# Patient Record
Sex: MALE | Race: WHITE
[De-identification: names, ages, dates, MRNs, and addresses within clinical notes are randomized per-mention and may not be internally consistent; named-entity substitution may affect disease eponyms.]

---

## 2017-03-02 ENCOUNTER — HOSPITAL ENCOUNTER (OUTPATIENT)
Dept: HOSPITAL 33 - ED | Age: 47
Setting detail: OBSERVATION
LOS: 1 days | Discharge: HOME | End: 2017-03-03
Attending: FAMILY MEDICINE | Admitting: FAMILY MEDICINE
Payer: SELF-PAY

## 2017-03-02 DIAGNOSIS — F10.129: Primary | ICD-10-CM

## 2017-03-02 DIAGNOSIS — G89.4: ICD-10-CM

## 2017-03-02 DIAGNOSIS — Z79.899: ICD-10-CM

## 2017-03-02 DIAGNOSIS — F45.42: ICD-10-CM

## 2017-03-02 LAB
ALBUMIN SERPL-MCNC: 4 G/DL (ref 3.4–5)
ALP SERPL-CCNC: 158 U/L (ref 46–116)
ALT SERPL-CCNC: 89 U/L (ref 12–78)
ANION GAP SERPL CALC-SCNC: 10.9 MEQ/L (ref 5–15)
APAP SPEC-MCNC: < 2 UG/ML (ref 10–30)
AST SERPL QL: 94 U/L (ref 15–37)
BASOPHILS NFR BLD AUTO: 0.5 % (ref 0–0.4)
BILIRUB BLD-MCNC: 0.1 MG/DL (ref 0.2–1)
BUN SERPL-MCNC: 8 MG/DL (ref 9–20)
CHLORIDE SERPL-SCNC: 103 MEQ/L (ref 98–107)
CO2 SERPL-SCNC: 24.9 MEQ/L (ref 21–32)
GLUCOSE SERPL-MCNC: 100 MG/DL (ref 70–110)
LIPASE SERPL-CCNC: 145 U/L (ref 73–393)
MCH RBC QN AUTO: 33.5 PG (ref 26–32)
NEUTROPHILS NFR BLD AUTO: 48.9 % (ref 36–66)
PLATELET # BLD AUTO: 137 K/MM3 (ref 150–450)
POTASSIUM SERPLBLD-SCNC: 3.9 MEQ/L (ref 3.5–5.1)
PROT SERPL-MCNC: 8 GM/DL (ref 6.4–8.2)
RBC # BLD AUTO: 5.19 M/MM3 (ref 4.1–5.6)
SODIUM SERPL-SCNC: 142 MEQ/L (ref 136–145)
TROPONIN T SERPL HS-MCNC: < 0.017 NG/ML (ref 0–0.06)
WBC # BLD AUTO: 7.6 K/MM3 (ref 4–10.5)

## 2017-03-02 PROCEDURE — 99285 EMERGENCY DEPT VISIT HI MDM: CPT

## 2017-03-02 PROCEDURE — 93005 ELECTROCARDIOGRAM TRACING: CPT

## 2017-03-02 PROCEDURE — 83690 ASSAY OF LIPASE: CPT

## 2017-03-02 PROCEDURE — 96374 THER/PROPH/DIAG INJ IV PUSH: CPT

## 2017-03-02 PROCEDURE — 80307 DRUG TEST PRSMV CHEM ANLYZR: CPT

## 2017-03-02 PROCEDURE — 80320 DRUG SCREEN QUANTALCOHOLS: CPT

## 2017-03-02 PROCEDURE — 36415 COLL VENOUS BLD VENIPUNCTURE: CPT

## 2017-03-02 PROCEDURE — 85025 COMPLETE CBC W/AUTO DIFF WBC: CPT

## 2017-03-02 PROCEDURE — 82150 ASSAY OF AMYLASE: CPT

## 2017-03-02 PROCEDURE — 96360 HYDRATION IV INFUSION INIT: CPT

## 2017-03-02 PROCEDURE — 36000 PLACE NEEDLE IN VEIN: CPT

## 2017-03-02 PROCEDURE — 83986 ASSAY PH BODY FLUID NOS: CPT

## 2017-03-02 PROCEDURE — 84484 ASSAY OF TROPONIN QUANT: CPT

## 2017-03-02 PROCEDURE — 80053 COMPREHEN METABOLIC PANEL: CPT

## 2017-03-02 NOTE — ERPHSYRPT
- History of Present Illness


Time Seen by Provider: 03/02/17 15:35


Source: patient


Exam Limitations: no limitations


Patient Subjective Stated Complaint: [facility] DEPARTMENT JANEE IN PT WHO BLEW 

A .399.  PT AGGRESSIVE AT TIMES WITH NURSING STAFF.


Triage Nursing Assessment: PT AGGRESSIVE AT TIMES WITH THE NURSING STAFF. 

[facility] DEP. PLACED CUFFS BACK ON PT. SPEECH IS RAMBLED. PT ALERT X 2. SKIN 

PINK WARM AND DRY. RESPIRATIONS EVEN AND UNLABORED.


Physician History: 





This is a 46-year-old white male with history of seizures, heart problems who 

states he drinks a pint a day daily for about a year brought by the [facility] for medical clearance





Patient was apparently found walking had a breath alcohol of 399.


Patient initially somewhat agitated and aggressive with the nurses patient 

currently is allowing me to complete his examination.


Patient states she's been having a cough he's had some chest pain with his 

cough.


No vomiting no diarrhea no abdominal pain.








Past medical history includes heart problems, seizures








.








Timing/Duration: other (patient brought by [facility] today states he drinks daily 

cough unknown period time associated with pain with coughing)


Modifying Factors: Improves With: nothing


Associated Symptoms: cough, chest pain (chest pain with cough), No nausea, No 

vomiting, No abdominal pain, No shortness of breath, No heartburn, No 

diaphoresis, No fever, No headaches, No loss of appetite, No malaise, No rash, 

No syncope, No seizure, No weakness


Allergies/Adverse Reactions: 








No Known Drug Allergies Allergy (Verified 10/08/16 17:47)


 





Home Medications: 








Hydrocodone Bit/Acetaminophen [Norco 7.5-325 Tablet] 1 each PO DAILY 03/02/17 [

History]





Hx Tetanus, Diphtheria Vaccination/Date Given: No


Hx Influenza Vaccination/Date Given: No


Hx Pneumococcal Vaccination/Date Given: No


Immunizations Up to Date: No





- Review of Systems


Constitutional: No Fever, No Chills


Eyes: No Symptoms


Ears, Nose, & Throat: No Symptoms


Respiratory: Cough, No Cyanosis, No Dyspnea, No Dyspnea on Exertion (CERVANTES), No 

Stridor, No Wheezing


Cardiac: Chest Pain (chest chest pain with cough), No Edema, No Palpitations, 

No Syncope, No Orthopnea


Abdominal/Gastrointestinal: No Abdominal Pain, No Nausea, No Vomiting, No 

Diarrhea


Genitourinary Symptoms: No Dysuria


Musculoskeletal: No Back Pain, No Neck Pain


Skin: No Rash


Neurological: No Dizziness, No Focal Weakness, No Gait Changes, No Headache, No 

Irritability, No Lethargy, No Paralysis, No Parasthesia, No Seizure, No Sensory 

Changes, No Speech Changes, No Tics, No Tremors, No Vertigo


Psychological: Alcohol Abuse (patient drinks a pint of  alcohol daily)


Endocrine: No Symptoms


All Other Systems: Reviewed and Negative





- Past Medical History


Pertinent Past Medical History: Yes


Neurological History: Seizures


ENT History: No Pertinent History


Cardiac History: Hypertension, Myocardial Infarction (MI)


Respiratory History: No Pertinent History


Endocrine Medical History: No Pertinent History


Musculoskeletal History: Arthritis


GI Medical History: No Pertinent History


 History: No Pertinent History


Psycho-Social History: No Pertinent History


Male Reproductive Disorders: No Pertinent History


Other Medical History: 2 LOWER DISCS ARE OUT,





- Past Surgical History


Past Surgical History: Yes


Neuro Surgical History: No Pertinent History


Cardiac: Cardiac Catheterization


Respiratory: No Pertinent History


Gastrointestinal: No Pertinent History


Genitourinary: No Pertinent History


Musculoskeletal: No Pertinent History


Male Surgical History: No Pertinent History





- Social History


Smoking Status: Current every day smoker


How long have you smoked: 20 YRS


Exposure to second hand smoke: No


Drug Use: none


Patient Lives Alone: No





- Nursing Vital Signs


Nursing Vital Signs: 


 Initial Vital Signs











Temperature                    97.6 F


 


Temperature Source             Axillary


 


Pulse Rate                     70


 


Respiratory Rate               16


 


Blood Pressure [Left Arm]      194/82


 


Pain Intensity                 0

















- Physical Exam


General Appearance: other (well-developed well-nourished white male , flushed 

in appearance, agitated at times)


Eye Exam: PERRL/EOMI, eyes nml inspection


Ears, Nose, Throat Exam: normal ENT inspection, TMs normal, pharynx normal, 

moist mucous membranes


Neck Exam: normal inspection, non-tender, supple, full range of motion


Respiratory Exam: rhonchi (bilateral rhonchi), No diminished breath sounds


Cardiovascular Exam: regular rate/rhythm, normal heart sounds, normal 

peripheral pulses


Gastrointestinal/Abdomen Exam: soft, normal bowel sounds, No tenderness, No mass


Back Exam: normal inspection, normal range of motion, No CVA tenderness, No 

vertebral tenderness


Extremity Exam: normal inspection, normal range of motion, pelvis stable


Neurologic Exam: alert, oriented x 3, normal mood/affect, nml cerebellar 

function, nml station & gait, sensation nml, other (patient agitated at times 

swearing at staff), No motor deficits


Skin Exam: other (flushed in appearance)


**SpO2 Interpretation**: normal





- Course


Nursing assessment & vital signs reviewed: Yes


EKG Interpreted by Me: RATE (93 bpm), Sinus Rhythm, Other (ekg, sinus [id number] 

bpm, axis SI/QIII pattern, no acute st ot t wave abnormalities noted)


Ordered Tests: 


 Active Orders 24 hr











 Category Date Time Status


 


 Bedrest with BRP/BSC ROUTINE Activity  03/02/17 18:48 Active


 


 Admission/Status Order ROUTINE Care  03/02/17 18:48 Active


 


 Call Admit Doctor for Orders ON ADMISSION Care  03/02/17 18:48 Active


 


 Code Status Order ROUTINE Care  03/02/17 18:48 Active


 


 EKG-ER Only STAT Care  03/02/17 15:33 Completed


 


 IV Care Q6H Care  03/02/17 18:48 Active


 


 IV Insertion STAT Care  03/02/17 15:33 Completed


 


 Telemetry ROUTINE Care  03/02/17 18:48 Active


 


 Clear Liquid Diet  03/02/17 Breakfast Active


 


 ACETAMINOPHEN Stat Lab  03/02/17 15:52 Completed


 


 ACETAMINOPHEN Urgent Lab  03/02/17 22:30 Ordered


 


 AMYLASE Stat Lab  03/02/17 15:52 Completed


 


 CBC W DIFF AM.LAB Lab  03/03/17 04:00 Ordered


 


 CBC W DIFF Stat Lab  03/02/17 15:52 Completed


 


 CMP AM.LAB Lab  03/03/17 04:00 Ordered


 


 CMP Stat Lab  03/02/17 15:52 Completed


 


 Ethyl Alcohol,Urine Stat Lab  03/02/17 16:15 Completed


 


 Ethyl Alcohol,Urine Urgent Lab  03/03/17 08:00 Ordered


 


 LIPASE Stat Lab  03/02/17 15:52 Completed


 


 SALICYLATE Stat Lab  03/02/17 15:52 Completed


 


 TROPONIN Stat Lab  03/02/17 15:52 Completed


 


 Urine Triage Profile Stat Lab  03/02/17 16:15 Completed


 


 Transfer Order Routine Transfer  03/02/17 18:19 Completed








Medication Summary











Generic Name Dose Route Start Last Admin





  Trade Name Freq  PRN Reason Stop Dose Admin


 


Sodium Chloride  1,000 mls @ 100 mls/hr  03/02/17 18:48  03/02/17 19:01





  Sodium Chloride 0.9% 1000 Ml  IV  04/01/17 18:47  100 mls/hr





  .Q10H LALI   Administration


 


Lorazepam  1 mg  03/02/17 18:48  





  Ativan 2 Mg/1 Ml Vial***  IV  04/01/17 18:47  





  PRN PRN   





  CIWA SCORE   


 


Nicotine  21 mg  03/02/17 19:15  





  Nicoderm Cq 21 Mg***  TOP  04/01/17 19:14  





  Q24H LALI   














Discontinued Medications














Generic Name Dose Route Start Last Admin





  Trade Name Freq  PRN Reason Stop Dose Admin


 


Sodium Chloride  1,000 mls @ 999 mls/hr  03/02/17 15:33  03/02/17 15:53





  Sodium Chloride 0.9% 1000 Ml  IV  03/02/17 16:33  999 mls/hr





  .Q1H1M STA   Administration


 


Sodium Chloride  Confirm  03/02/17 15:49  





  Sodium Chloride 0.9% 1000 Ml  Administered  03/02/17 15:50  





  Dose   





  1,000 mls @ ud   





  .ROUTE   





  .STK-MED ONE   


 


Thiamine HCl  100 mg  03/02/17 15:33  03/02/17 15:53





  Thiamine 200 Mg/2 Ml***  IV  03/02/17 15:34  100 mg





  STAT ONE   Administration


 


Thiamine HCl  Confirm  03/02/17 15:49  





  Thiamine 200 Mg/2 Ml***  Administered  03/02/17 15:50  





  Dose   





  200 mg   





  .ROUTE   





  .STK-MED ONE   











Lab/Rad Data: 


 Laboratory Result Diagrams





 03/02/17 15:52 





 03/02/17 15:52 





 Laboratory Results











  03/02/17 03/02/17 03/02/17 Range/Units





  16:15 16:15 15:52 


 


WBC     (4.0-10.5)  K/mm3


 


RBC     (4.1-5.6)  M/mm3


 


Hgb     (12.5-18.0)  gm/dl


 


Hct     (42-50)  %


 


MCV     ()  fl


 


MCH     (26-32)  pg


 


MCHC     (32-36)  g/dl


 


RDW     (11.5-14.0)  %


 


Plt Count     (150-450)  K/mm3


 


MPV     (6-9.5)  fl


 


Gran %     (36.0-66.0)  %


 


Lymphocytes %     (24.0-44.0)  %


 


Monocytes %     (0.0-12.0)  %


 


Eosinophils %     (0.00-5.0)  %


 


Basophils %     (0.0-0.4)  %


 


Basophils #     (0-0.4)  


 


Sodium    142  (136-145)  mEq/L


 


Potassium    3.9  (3.5-5.1)  mEq/L


 


Chloride    103  ()  mEq/L


 


Carbon Dioxide    24.9  (21-32)  mEq/L


 


Anion Gap    10.9  (5-15)  MEQ/L


 


BUN    8 L  (9-20)  mg/dL


 


Creatinine    0.66  (0.55-1.30)  mg/dl


 


Estimated GFR    > 60  ML/MIN


 


Glucose    100  ()  MG/DL


 


Calcium    9.3  (8.5-10.1)  mg/dL


 


Total Bilirubin    0.1 L  (0.2-1.0)  mg/dL


 


AST    94 H  (15-37)  U/L


 


ALT    89 H  (12-78)  U/L


 


Alkaline Phosphatase    158 H  ()  U/L


 


Troponin I    < 0.017  (0.000-0.056)  ng/ml


 


Serum Total Protein    8.0  (6.4-8.2)  gm/dL


 


Albumin    4.0  (3.4-5.0)  g/dL


 


Amylase    55  ()  U/L


 


Lipase    145  ()  U/L


 


Salicylates    5.3  (2.8-20.0)  mg/dl


 


Urine Opiates Level   NEG.   (NEGATIVE)  


 


Ur Methadone   NEG.   (NEGATIVE)  


 


Acetaminophen    < 2.0 L  (10-30)  ug/ml


 


Urine Barbiturates   NEG.   (NEGATIVE)  


 


Ur Phencyclidine (PCP)   NEG.   (NEGATIVE)  


 


Urine Amphetamine   NEG.   (NEGATIVE)  


 


U Benzodiazepine Level   NEG.   (NEGATIVE)  


 


Urine Cocaine   NEG.   (NEGATIVE)  


 


Urine Marijuana (THC)   NEG.   (NEGATIVE)  


 


Urine pH  6.5    (3-8.5)  


 


Urine Ethyl Alcohol  518 H    (0.00-20)  mg/dl














  03/02/17 Range/Units





  15:52 


 


WBC  7.6  (4.0-10.5)  K/mm3


 


RBC  5.19  (4.1-5.6)  M/mm3


 


Hgb  17.4  (12.5-18.0)  gm/dl


 


Hct  50.8 H  (42-50)  %


 


MCV  97.9  ()  fl


 


MCH  33.5 H  (26-32)  pg


 


MCHC  34.3  (32-36)  g/dl


 


RDW  14.7 H  (11.5-14.0)  %


 


Plt Count  137 L  (150-450)  K/mm3


 


MPV  9.3  (6-9.5)  fl


 


Gran %  48.9  (36.0-66.0)  %


 


Lymphocytes %  40.8  (24.0-44.0)  %


 


Monocytes %  8.6  (0.0-12.0)  %


 


Eosinophils %  1.2  (0.00-5.0)  %


 


Basophils %  0.5  (0.0-0.4)  %


 


Basophils #  0.04  (0-0.4)  


 


Sodium   (136-145)  mEq/L


 


Potassium   (3.5-5.1)  mEq/L


 


Chloride   ()  mEq/L


 


Carbon Dioxide   (21-32)  mEq/L


 


Anion Gap   (5-15)  MEQ/L


 


BUN   (9-20)  mg/dL


 


Creatinine   (0.55-1.30)  mg/dl


 


Estimated GFR   ML/MIN


 


Glucose   ()  MG/DL


 


Calcium   (8.5-10.1)  mg/dL


 


Total Bilirubin   (0.2-1.0)  mg/dL


 


AST   (15-37)  U/L


 


ALT   (12-78)  U/L


 


Alkaline Phosphatase   ()  U/L


 


Troponin I   (0.000-0.056)  ng/ml


 


Serum Total Protein   (6.4-8.2)  gm/dL


 


Albumin   (3.4-5.0)  g/dL


 


Amylase   ()  U/L


 


Lipase   ()  U/L


 


Salicylates   (2.8-20.0)  mg/dl


 


Urine Opiates Level   (NEGATIVE)  


 


Ur Methadone   (NEGATIVE)  


 


Acetaminophen   (10-30)  ug/ml


 


Urine Barbiturates   (NEGATIVE)  


 


Ur Phencyclidine (PCP)   (NEGATIVE)  


 


Urine Amphetamine   (NEGATIVE)  


 


U Benzodiazepine Level   (NEGATIVE)  


 


Urine Cocaine   (NEGATIVE)  


 


Urine Marijuana (THC)   (NEGATIVE)  


 


Urine pH   (3-8.5)  


 


Urine Ethyl Alcohol   (0.00-20)  mg/dl














- Progress


Progress: improved


Progress Note: 





03/02/17 18:16


Despite patient being alert and oriented 3 patient has a urine alcohol level 

of greater than 500 case is discussed with Dr. Laird Will plan to place 

patient on ICU telemetry will provide IV saline thiamine Ativan











- Departure


Time of Disposition: 18:17


Departure Disposition: Observation


Clinical Impression: 


 ALCOHOL TOXICITY





Alcohol intoxication


Qualifiers:


 Complication of substance-induced condition: with unspecified complication 

Qualified Code(s): F10.129 - Alcohol abuse with intoxication, unspecified





Condition: Fair


Critical Care Time: No

## 2017-03-03 VITALS — DIASTOLIC BLOOD PRESSURE: 109 MMHG | OXYGEN SATURATION: 99 % | SYSTOLIC BLOOD PRESSURE: 172 MMHG | HEART RATE: 90 BPM

## 2017-03-03 LAB
ALBUMIN SERPL-MCNC: 3.2 G/DL (ref 3.4–5)
ALP SERPL-CCNC: 129 U/L (ref 46–116)
ALT SERPL-CCNC: 69 U/L (ref 12–78)
ANION GAP SERPL CALC-SCNC: 15 MEQ/L (ref 5–15)
AST SERPL QL: 64 U/L (ref 15–37)
BILIRUB BLD-MCNC: 0.2 MG/DL (ref 0.2–1)
BUN SERPL-MCNC: 5 MG/DL (ref 9–20)
CELLS COUNTED: 100
CHLORIDE SERPL-SCNC: 106 MEQ/L (ref 98–107)
CO2 SERPL-SCNC: 25.2 MEQ/L (ref 21–32)
GLUCOSE SERPL-MCNC: 92 MG/DL (ref 70–110)
MCH RBC QN AUTO: 34.2 PG (ref 26–32)
NEUTS BAND # BLD MANUAL: 1 % (ref 0–2)
PLATELET # BLD AUTO: 93 K/MM3 (ref 150–450)
POTASSIUM SERPLBLD-SCNC: 4.1 MEQ/L (ref 3.5–5.1)
PROT SERPL-MCNC: 6.7 GM/DL (ref 6.4–8.2)
RBC # BLD AUTO: 4.33 M/MM3 (ref 4.1–5.6)
SODIUM SERPL-SCNC: 142 MEQ/L (ref 136–145)
TOXIC GRANULES BLD QL SMEAR: (no result)
VARIANT LYMPHS BLD QL SMEAR: 4 %
WBC # BLD AUTO: 3.7 K/MM3 (ref 4–10.5)

## 2017-03-03 NOTE — SSS
DISCHARGE DIAGNOSIS: INTOXICATION.



HISTORY OF PRESENT ILLNESS:  The patient is a 46 year-old white male patient who 
apparently is an alcoholic. He apparently drinks a pint of hard liquor every day. He was 
found by the police out wandering around with garbled speech and he was brought to the 
emergency room after having a 0.399 on the Breathalyzer test. The patient is felt the need 
to be admitted to the hospital for observation due to high level of intoxication that the 
patient has for his safety. He was placed in the ICU for monitoring overnight. 

 

MEDICATIONS: Essentially remarkable for only taking Norco on a basis for chronic pain. The 
patient is on no other medications. 

 

ALLERGIES: NKDA.  

 

PHYSICAL EXAMINATION: The patient is of small stature but otherwise well nourished and 
well developed. 

VITAL SIGNS: In the emergency room showed temperature 97.6F axillary, pulse 85, 
respiratory rate 18, blood pressure 139/89. O2 saturation reported as normal. 

HEENT:  Normocephalic, atraumatic. Pupils equal round reactive to light. Extraocular 
movements intact. Oropharynx is pink and moist.

NECK:  Supple without lymphadenopathy, thyromegaly or JVD. 

CHEST: Clear to auscultation with good air movement bilaterally. 

HEART: Regular rate and rhythm without murmurs, rubs or gallops.

ABDOMEN:  Soft, nontender, nondistended without hepatosplenomegaly or masses.

EXTREMITIES:  Without clubbing, cyanosis or edema. 

NEUROLOGIC: The patient is alert and oriented x3 this morning, cooperative and pleasant.  



LAB DATA AND TESTS:  Laboratory studies from the emergency room urine drug screen was 
negative. Metabolic panel showed glucose 100, BUN 8, creatinine 0.66. Liver enzymes were 
elevated at 94 SGOT, 89 SGPT 158 alkaline phosphatase. Amylase and lipase were normal. 
Troponin was less than 0.017.  Acetaminophen and salicylates were negative. He had a white 
blood cell count 11,100, hemoglobin 14.7, PLT count 194,000. He had urine performed for 
ETOH level was over 500 but I am unsure how this correlates with blood alcohol level. 
Apparently our machine will not currently do blood alcohol levels. 



HOSPITAL COURSE: By the next morning the patient's urine ETOH was 204 and was normal being 
0 to 20 but again the patient was awake, alert, and back to his usual state of health. We 
did have a discussion with him about alcoholism and about elevation in his liver enzymes. 
We suggested that he have a hepatitis panel drawn to rule out hepatitis C but he declined 
to have this done and reported he will have his primary care physician, Dr. Corby Hernandez, do this for him as an outpatient. The patient was felt to be ready for 
discharge home at this time. He reports that he will be calling his mother for a ride 
home.

## 2017-03-03 NOTE — PCM.DCORD
- Discharge


Discharge Date: 03/03/17


Condition: Fair


Prescriptions: 


Continue


   Hydrocodone Bit/Acetaminophen [Norco 7.5-325 Tablet] 1 each PO DAILY


Follow up with: 


EDILBERTO AVILEZ [Primary Care Provider] -

## 2017-06-15 ENCOUNTER — HOSPITAL ENCOUNTER (EMERGENCY)
Dept: HOSPITAL 33 - ED | Age: 47
Discharge: HOME | End: 2017-06-15
Payer: SELF-PAY

## 2017-06-15 VITALS — DIASTOLIC BLOOD PRESSURE: 118 MMHG | HEART RATE: 103 BPM | SYSTOLIC BLOOD PRESSURE: 167 MMHG | OXYGEN SATURATION: 98 %

## 2017-06-15 DIAGNOSIS — I10: ICD-10-CM

## 2017-06-15 DIAGNOSIS — S20.211A: Primary | ICD-10-CM

## 2017-06-15 DIAGNOSIS — I25.2: ICD-10-CM
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
## 2017-06-15 PROCEDURE — 71100 X-RAY EXAM RIBS UNI 2 VIEWS: CPT

## 2017-06-15 PROCEDURE — 99282 EMERGENCY DEPT VISIT SF MDM: CPT

## 2017-06-15 PROCEDURE — 71020: CPT

## 2017-06-15 PROCEDURE — 82962 GLUCOSE BLOOD TEST: CPT

## 2017-06-15 NOTE — XRAY
Indication: Right-sided pain following fall one week ago.



Comparison: None.



2 views of the right ribs demonstrates mild acromioclavicular degenerative

arthropathy.  No other bony, articular, or soft tissue abnormalities.

## 2017-06-15 NOTE — XRAY
Indication: Right-sided pain following fall one week ago.



Comparison: October 22, 2012.



PA/lateral chest again demonstrates normal heart, lungs, and bony thorax.

## 2017-06-15 NOTE — ERPHSYRPT
- History of Present Illness


Time Seen by Provider: 06/15/17 14:50


Source: patient


Exam Limitations: clinical condition


Patient Subjective Stated Complaint: pt here to see if he is diabetic because 

hes family is and co right rib pain after a fall a week ago, no other injury


Triage Nursing Assessment: pt has no bruising to right rib area, no sob, resp 

easy, skin w/d


Physician History: 





PATIENT WITH HISTORY OF HYERTENSION, SEIZURE DISORDER, CORONARY ARTERY DISEASE, 

FELL A WEEK AGO SUSTAINED INJURY TO HIS RIGHT SIDE OF HIS CHEST. HAS PAIN UPON 

INSPIRATION AND MOTION OF RIGHT ARM. DENIES ASSOCIATED HEAD NECK OR BACK INJURY.





Occurred: last week


Reason for Fall: lost balance (FELL AGAINST CAR DOOR)


Injuries/Pain Location: chest


Loss of Consciousness: no loss of consciousness


Quality: sharpness


Severity of Pain-Max: moderate


Severity of Pain-Current: moderate


Modifying Factors: Improves With: movement, other (TAKES NORCO DAILY)


Associated Symptoms (Fall): chest pain (RIGHT SIDED RIB PAIN)


Allergies/Adverse Reactions: 








No Known Drug Allergies Allergy (Verified 06/15/17 14:47)


 





Home Medications: 








Hydrocodone Bit/Acetaminophen [Norco 7.5-325 Tablet] 1 each PO DAILY 03/02/17 [

History]





Hx Tetanus, Diphtheria Vaccination/Date Given: No


Hx Influenza Vaccination/Date Given: No


Hx Pneumococcal Vaccination/Date Given: No


Immunizations Up to Date: Yes





- Review of Systems


Constitutional: No Symptoms


Cardiac: Chest Pain


Neurological: No Symptoms


Psychological: No Symptoms





- Past Medical History


Pertinent Past Medical History: Yes


Neurological History: Seizures


ENT History: No Pertinent History


Cardiac History: Hypertension, Myocardial Infarction (MI)


Respiratory History: No Pertinent History


Endocrine Medical History: No Pertinent History


Musculoskeletal History: Arthritis


GI Medical History: No Pertinent History


 History: No Pertinent History


Psycho-Social History: No Pertinent History


Male Reproductive Disorders: No Pertinent History


Other Medical History: 2 LOWER DISCS ARE OUT,





- Past Surgical History


Past Surgical History: Yes


Neuro Surgical History: No Pertinent History


Cardiac: Cardiac Catheterization


Respiratory: No Pertinent History


Gastrointestinal: No Pertinent History


Genitourinary: No Pertinent History


Musculoskeletal: No Pertinent History


Male Surgical History: No Pertinent History





- Social History


Smoking Status: Current every day smoker


How long have you smoked: 20 YRS


Exposure to second hand smoke: Yes


Drug Use: none


Patient Lives Alone: No





- Nursing Vital Signs


Nursing Vital Signs: 


 Initial Vital Signs











Temperature                    97.4 F


 


Temperature Source             Oral


 


Pulse Rate                     103


 


Respiratory Rate               16


 


Blood Pressure []              167/118


 


Pain Intensity                 7

















- Santino Coma Score


Best Eye Response (Santino): (4) open spontaneously


Best Verbal Response (Santino): (5) oriented


Best Motor Response (Marshall): (6) obeys commands


Santino Total: 15





- Physical Exam


General Appearance: no apparent distress, alert


Head Injury: no evidence of injury


Eye Exam: PERRL/EOMI


ENT Exam: airway nml


Neck Exam: normal inspection, No tenderness


Respiratory/Chest Exam: chest tenderness (RIGHT LATERAL CHEST WALL TENDERNESS 

5TH TO 7TH RIBS, INFERIOR TO AXILLA, NO CREPITUS OR ECCHYMOSIS), normal breath 

sounds, No respiratory distress


Cardiovascular Exam: normal heart sounds, regular rate/rhythm


Gastrointestinal Exam: soft, No tenderness, No distention, No guarding, No 

ecchymosis


Back Exam: normal inspection, No vertebral tenderness


Extremity Exam: normal inspection, normal range of motion, pelvis stable, No 

deformities


Peripheral Pulses: carotid (R): 2+, carotid (L): 2+, femoral (R): 2+, femoral (L

): 2+, dorsalis-pedis (R): 2+, dorsalis-pedis (L): 2+


Neurologic Exam: alert, oriented x 3, cooperative, sensation nml, No motor 

deficits


Skin Exam: normal color, warm, dry


**SpO2 Interpretation**: normal


SpO2: 98


Oxygen Delivery: Room Air





- Radiology Exams


  ** Chest


X-ray Interpretation: Discussed w/ radiologist, Negative





  ** Right Ribs


X-ray Interpretation: Discussed w/ radiologist (MILD ACROMIOCLAVICLAR  

DEGNERATIVE ARTHROPATHRY, NO FRACTURE)


Ordered Tests: 


 Active Orders 24 hr











 Category Date Time Status


 


 ACCUCHECK [Accucheck] STAT Care  06/15/17 15:47 Active


 


 CHEST 2 VIEWS (PA AND LAT) Stat Exams  06/15/17 15:06 Completed


 


 RIBS UNILATERAL Stat Exams  06/15/17 15:06 Completed














- Progress


Progress Note: 





06/15/17 15:50


ACCUCHECK 193


Counseled pt/family regarding: diagnosis, need for follow-up, rad results





- Departure


Time of Disposition: 15:50


Departure Disposition: Home


Clinical Impression: 


 RIGHT CHEST WALL CONTUSION





Condition: Stable


Critical Care Time: No


Referrals: 


EDILBERTO AVILEZ [Primary Care Provider] - 


Additional Instructions: 


CONTINUE ALL CURRENT MEDICATIONS FOR PAIN. FOLLOWUP WITH YOUR FAMILY PHYSICIAN 

TOMORROW FOR DIABETIC WORKUP.

## 2017-10-24 ENCOUNTER — HOSPITAL ENCOUNTER (EMERGENCY)
Dept: HOSPITAL 33 - ED | Age: 47
Discharge: LEFT BEFORE DISCHARGE | End: 2017-10-24
Payer: COMMERCIAL

## 2017-10-24 VITALS — OXYGEN SATURATION: 97 %

## 2017-10-24 VITALS — HEART RATE: 112 BPM | SYSTOLIC BLOOD PRESSURE: 146 MMHG | DIASTOLIC BLOOD PRESSURE: 106 MMHG

## 2017-10-24 DIAGNOSIS — Z91.14: ICD-10-CM

## 2017-10-24 DIAGNOSIS — F10.920: Primary | ICD-10-CM

## 2017-10-24 DIAGNOSIS — D69.6: ICD-10-CM

## 2017-10-24 DIAGNOSIS — R56.9: ICD-10-CM

## 2017-10-24 LAB
ALBUMIN SERPL-MCNC: 3.8 G/DL (ref 3.4–5)
ALP SERPL-CCNC: 198 U/L (ref 46–116)
ALT SERPL-CCNC: 221 U/L (ref 12–78)
ANION GAP SERPL CALC-SCNC: 18.5 MEQ/L (ref 5–15)
AST SERPL QL: 267 U/L (ref 15–37)
BACTERIA UR CULT: NO
BASE EXCESS BLDV CALC-SCNC: -3.2 MMOL/L (ref -2–2)
BASOPHILS NFR BLD AUTO: 0.1 % (ref 0–0.4)
BILIRUB BLD-MCNC: 1.2 MG/DL (ref 0.2–1)
BUN SERPL-MCNC: 5 MG/DL (ref 9–20)
CHLORIDE SERPL-SCNC: 100 MEQ/L (ref 98–107)
CO2 SERPL-SCNC: 21.8 MEQ/L (ref 21–32)
COHGB MFR BLDV: 6.1 % T HGB (ref 0–6.9)
COLLECTION TYPE: (no result)
COMPLETE URINE MICROSCOPIC?: YES
ETHANOL SERPL-MCNC: 0.29 % (ref 0–0.01)
GLUCOSE SERPL-MCNC: 109 MG/DL (ref 70–110)
GLUCOSE UR-MCNC: NEGATIVE MG/DL
HCO3 BLDV-SCNC: 21.3 MEQ/L (ref 22–28)
HGB BLDV-MCNC: 15.7 G/DL
INHALED O2 CONCENTRATION: 21 %
MAGNESIUM SERPL-MCNC: 1.8 MG/DL (ref 1.8–2.4)
MCH RBC QN AUTO: 34 PG (ref 26–32)
NEUTROPHILS NFR BLD AUTO: 75.4 % (ref 36–66)
PLATELET # BLD AUTO: 17 K/MM3 (ref 150–450)
POTASSIUM BLDV-SCNC: 3.3 MMOL/L (ref 3.5–5.1)
POTASSIUM SERPLBLD-SCNC: 3.1 MEQ/L (ref 3.5–5.1)
PROT SERPL-MCNC: 7.5 GM/DL (ref 6.4–8.2)
RBC # BLD AUTO: 4.29 M/MM3 (ref 4.1–5.6)
SAO2 % BLDV: 90.4 % (ref 95–100)
SODIUM SERPL-SCNC: 137 MEQ/L (ref 136–145)
WBC # BLD AUTO: 7 K/MM3 (ref 4–10.5)
WBC URNS QL MICRO: (no result) /HPF (ref 0–5)

## 2017-10-24 PROCEDURE — 70450 CT HEAD/BRAIN W/O DYE: CPT

## 2017-10-24 PROCEDURE — 81000 URINALYSIS NONAUTO W/SCOPE: CPT

## 2017-10-24 PROCEDURE — 71020: CPT

## 2017-10-24 PROCEDURE — 83735 ASSAY OF MAGNESIUM: CPT

## 2017-10-24 PROCEDURE — 36000 PLACE NEEDLE IN VEIN: CPT

## 2017-10-24 PROCEDURE — 99284 EMERGENCY DEPT VISIT MOD MDM: CPT

## 2017-10-24 PROCEDURE — 96360 HYDRATION IV INFUSION INIT: CPT

## 2017-10-24 PROCEDURE — 93041 RHYTHM ECG TRACING: CPT

## 2017-10-24 PROCEDURE — 83605 ASSAY OF LACTIC ACID: CPT

## 2017-10-24 PROCEDURE — 96372 THER/PROPH/DIAG INJ SC/IM: CPT

## 2017-10-24 PROCEDURE — 36415 COLL VENOUS BLD VENIPUNCTURE: CPT

## 2017-10-24 PROCEDURE — 80307 DRUG TEST PRSMV CHEM ANLYZR: CPT

## 2017-10-24 PROCEDURE — 80053 COMPREHEN METABOLIC PANEL: CPT

## 2017-10-24 PROCEDURE — 93005 ELECTROCARDIOGRAM TRACING: CPT

## 2017-10-24 PROCEDURE — 82805 BLOOD GASES W/O2 SATURATION: CPT

## 2017-10-24 PROCEDURE — 85025 COMPLETE CBC W/AUTO DIFF WBC: CPT

## 2017-10-24 PROCEDURE — 80185 ASSAY OF PHENYTOIN TOTAL: CPT

## 2017-10-24 NOTE — ERPHSYRPT
- History of Present Illness


Source: patient, other (friend)


Patient Subjective Stated Complaint: pt states he has been having seizures for 

the past few days. opt states in the past he was a heavy alcoholic. states he 

has not drank in 2 weeks before today. pt also c/o generalized weakness.


Triage Nursing Assessment: pt pink,warm dry. pt alert and oriented x3. pt 

smells of ETOH. pt afebrile.


Severity: moderate


Hx Tetanus, Diphtheria Vaccination/Date Given: Yes (unknown)


Hx Influenza Vaccination/Date Given: No


Hx Pneumococcal Vaccination/Date Given: No


Immunizations Up to Date: Yes





<TRACY VANESSA - Last Filed: 10/24/17 18:49>





<MADI TY - Last Filed: 10/24/17 21:14>





- History of Present Illness


Time Seen by Provider: 10/24/17 18:22


Physician History: 





CC: seizures


Hx: 48 y/o patient of Dr Hernandez. He has hx of seizures and takes dilantin 

but has not been taking a normal dose. He is a chronic drinker of alcohol. 

Friend reports he has had some seizures. Bit tongue. Bruise on back. A little 

confused. Denies other injury. No neck or back pain. No hx of DM. Has cut back 

on alcohol. May be out of norco.  (TRACY VANESSA)


Allergies/Adverse Reactions: 








No Known Drug Allergies Allergy (Verified 10/24/17 18:39)


 





Home Medications: 








Phenytoin Sod Extended 100 mg* [Dilantin 100 MG***] 300 mg PO DAILY 10/24/17 [

History]








- Review of Systems


Constitutional: No Fever, No Chills


Eyes: No Vision Changes


Ears, Nose, & Throat: No Symptoms


Respiratory: No Cough, No Dyspnea


Cardiac: No Chest Pain, No Syncope


Abdominal/Gastrointestinal: No Abdominal Pain, No Nausea, No Vomiting, No 

Diarrhea


Genitourinary Symptoms: No Hematuria


Musculoskeletal: No Back Pain, No Neck Pain


Skin: No Rash


Neurological: Seizure, No Focal Weakness, No Headache, No Parasthesia


All Other Systems: Reviewed and Negative





<TRACY VANESSA - Last Filed: 10/24/17 18:49>





- Past Medical History


Pertinent Past Medical History: Yes


Neurological History: Seizures


ENT History: No Pertinent History


Cardiac History: Hypertension, Myocardial Infarction (MI)


Respiratory History: No Pertinent History


Endocrine Medical History: No Pertinent History


Musculoskeletal History: Arthritis


GI Medical History: No Pertinent History


 History: No Pertinent History


Psycho-Social History: No Pertinent History


Male Reproductive Disorders: No Pertinent History


Other Medical History: 2 LOWER DISCS ARE OUT,





- Past Surgical History


Past Surgical History: Yes


Neuro Surgical History: No Pertinent History


Cardiac: Cardiac Catheterization


Respiratory: No Pertinent History


Gastrointestinal: No Pertinent History


Genitourinary: No Pertinent History


Musculoskeletal: No Pertinent History


Male Surgical History: No Pertinent History





- Social History


Smoking Status: Current every day smoker


How long have you smoked: 11


Exposure to second hand smoke: No


Drug Use: none


Patient Lives Alone: No





<TRACY VANESSA - Last Filed: 10/24/17 18:49>





- Physical Exam


General Appearance: alert, other (smells of liquor, answers questions 

tangentially, follows commands)


Eye Exam: PERRL/EOMI


Ears, Nose, Throat Exam: normal ENT inspection, moist mucous membranes


Neck Exam: normal inspection, non-tender, supple


Respiratory Exam: normal breath sounds, other (eccymosis right flank)


Cardiovascular Exam: regular rate/rhythm, No murmur


Gastrointestinal/Abdomen Exam: soft, No tenderness, No distention, No mass, No 

guarding


Male Genitalia Exam: normal genitalia


Back Exam: No vertebral tenderness


Extremity Exam: normal inspection, normal range of motion


Neurologic Exam: alert, cooperative, CNs II-XII nml as tested, sensation nml, 

No motor deficits


Skin Exam: warm, dry, No rash


**SpO2 Interpretation**: normal


SpO2: 98


Oxygen Delivery: Room Air





<TRACY VANESSA - Last Filed: 10/24/17 18:49>





- Nursing Vital Signs


Nursing Vital Signs: 


 Initial Vital Signs











Temperature  98.0 F   10/24/17 18:31


 


Pulse Rate  112 H  10/24/17 18:31


 


Respiratory Rate  20   10/24/17 18:31


 


Blood Pressure  146/106   10/24/17 18:31


 


O2 Sat by Pulse Oximetry  98   10/24/17 18:31








 Pain Scale











Pain Intensity                 3

















- Course


Nursing assessment & vital signs reviewed: Yes





<TRACY VANESSA - Last Filed: 10/24/17 18:49>





- Course


EKG Interpreted by Me: Sinus Tach (), NORMAL AXIS, NORMAL INTERVALS, Non-

specific ST Changes





<TY,MADI - Last Filed: 10/24/17 21:14>


Ordered Tests: 


 Active Orders 24 hr











 Category Date Time Status


 


 Cardiac Monitor STAT Care  10/24/17 18:42 Active


 


 EKG-ER Only STAT Care  10/24/17 18:41 Active


 


 IV Insertion STAT Care  10/24/17 18:43 Active


 


 Pulse Oximetry (ED) STAT Care  10/24/17 18:41 Active


 


 CHEST 2 VIEWS (PA AND LAT) Stat Exams  10/24/17 18:49 Taken


 


 HEAD WITHOUT CONTRAST [CT] Stat Exams  10/24/17 18:43 Taken


 


 CBC W DIFF Stat Lab  10/24/17 19:16 Results


 


 CMP Stat Lab  10/24/17 19:00 Completed


 


 DILANTIN(PHENYTOIN) Stat Lab  10/24/17 19:00 Completed


 


 ETHYL ALCOHOL Stat Lab  10/24/17 19:00 Completed


 


 Lactic Acid Stat Lab  10/24/17 19:13 Results


 


 MAGNESIUM Stat Lab  10/24/17 19:00 Completed


 


 Pathologist Review Stat Lab  10/24/17 19:16 Results


 


 UA W/ MICROSCOPIC Stat Lab  10/24/17 19:40 Completed


 


 Urine Triage Profile Stat Lab  10/24/17 19:40 Completed


 


 VENOUS BLOOD GAS Stat Lab  10/24/17 19:13 Completed








Medication Summary











Generic Name Dose Route Start Last Admin





  Trade Name Freq  PRN Reason Stop Dose Admin


 


Dextrose/Lactated Ringer's  1,000 mls @ 200 mls/hr  10/24/17 19:00  10/24/17 19:

16





  Dextrose 5%-Lr Iv Solution 1000 Ml  IV  11/23/17 18:59  200 mls/hr





  .Q5H LALI   Administration














Discontinued Medications














Generic Name Dose Route Start Last Admin





  Trade Name Freq  PRN Reason Stop Dose Admin


 


Phenytoin Sodium 1,000 mg/  120 mls @ 240 mls/hr  10/24/17 19:45  





  Sodium Chloride  IV  10/24/17 20:14  





  STAT ONE   


 


Sodium Chloride  1,000 mls @ 999 mls/hr  10/24/17 19:46  10/24/17 20:04





  Sodium Chloride 0.9% 1000 Ml  IV  10/24/17 20:46  999 mls/hr





  .Q1H1M STA   Administration


 


Sodium Chloride  Confirm  10/24/17 20:01  





  Sodium Chloride 0.9% 1000 Ml  Administered  10/24/17 20:02  





  Dose   





  1,000 mls @ ud   





  .ROUTE   





  .STK-MED ONE   


 


Potassium Chloride  40 meq  10/24/17 19:55  10/24/17 20:23





  Klor Con 10 Meq***  PO  10/24/17 19:56  40 meq





  STAT ONE   Administration


 


Potassium Chloride  Confirm  10/24/17 20:06  





  Klor Con 10 Meq***  Administered  10/24/17 20:07  





  Dose   





  40 meq   





  PO   





  .STK-MED ONE   


 


Thiamine HCl  100 mg  10/24/17 18:43  10/24/17 19:17





  Thiamine 200 Mg/2 Ml***  IM  10/24/17 18:44  100 mg





  STAT ONE   Administration


 


Thiamine HCl  Confirm  10/24/17 19:11  





  Thiamine 200 Mg/2 Ml***  Administered  10/24/17 19:12  





  Dose   





  200 mg   





  .ROUTE   





  .STK-MED ONE   











Lab/Rad Data: 


 Laboratory Result Diagrams





 10/24/17 19:16 





 10/24/17 19:00 





 Laboratory Results











  10/24/17 10/24/17 10/24/17 Range/Units





  19:40 19:40 19:16 


 


WBC    7.0  (4.0-10.5)  K/mm3


 


RBC    4.29  (4.1-5.6)  M/mm3


 


Hgb    14.6  (12.5-18.0)  gm/dl


 


Hct    42.8  (42-50)  %


 


MCV    99.8  ()  fl


 


MCH    34.0 H  (26-32)  pg


 


MCHC    34.1  (32-36)  g/dl


 


RDW    15.2 H  (11.5-14.0)  %


 


Plt Count    17 L*  (150-450)  K/mm3


 


MPV    12.7 H  (6-9.5)  fl


 


Gran %    75.4 H  (36.0-66.0)  %


 


Lymphocytes %    14.5 L  (24.0-44.0)  %


 


Monocytes %    9.7  (0.0-12.0)  %


 


Eosinophils %    0.3  (0.00-5.0)  %


 


Basophils %    0.1  (0.0-0.4)  %


 


Basophils #    0.01  (0-0.4)  


 


Smear Path Review    Pending  


 


VBG pH     (7.32-7.42)  


 


VBG pCO2 at Pat Temp     (42-55)  mm/Hg


 


VBG pO2 at Pat Temp     (25-40)  mm/Hg


 


VBG HCO3     (22-28)  meq/L


 


VBG O2 Sat (Irvin)     ()  


 


VBG Base Excess     (-2.0-2.0)  


 


VBG Hemoglobin     


 


VBG Carboxyhemoglobin     (0.0-6.9)  % T HGB


 


POC Potassium     (3.5-5.1)  


 


Sodium     (136-145)  mEq/L


 


Potassium     (3.5-5.1)  mEq/L


 


Chloride     ()  mEq/L


 


Carbon Dioxide     (21-32)  mEq/L


 


Anion Gap     (5-15)  MEQ/L


 


BUN     (9-20)  mg/dL


 


Creatinine     (0.55-1.30)  mg/dl


 


Estimated GFR     ML/MIN


 


Glucose     ()  MG/DL


 


Lactic Acid     (0.4-2.0)  


 


Calcium     (8.5-10.1)  mg/dL


 


Magnesium     (1.8-2.4)  mg/dL


 


Total Bilirubin     (0.2-1.0)  mg/dL


 


AST     (15-37)  U/L


 


ALT     (12-78)  U/L


 


Alkaline Phosphatase     ()  U/L


 


Serum Total Protein     (6.4-8.2)  gm/dL


 


Albumin     (3.4-5.0)  g/dL


 


Ur Collection Type   VOID   


 


Urine Color   YELLOW   (YELLOW)  


 


Urine Appearance   CLEAR   (CLEAR)  


 


Urine pH   7.0   (5-6)  


 


Ur Specific Gravity   1.005   (1.005-1.025)  


 


Urine Protein   NEGATIVE   (Negative)  


 


Urine Ketones   NEGATIVE   (NEGATIVE)  


 


Urine Blood   250   (0-5)  Mike/ul


 


Urine Nitrite   NEGATIVE   (NEGATIVE)  


 


Urine Bilirubin   NEGATIVE   (NEGATIVE)  


 


Urine Urobilinogen   1   (0-1)  mg/dL


 


Ur Leukocyte Esterase   NEGATIVE   (NEGATIVE)  


 


Urine Microscopic RBC   2-5   (0-2)  /HPF


 


Urine Microscopic WBC   0-2   (0-5)  /HPF


 


Ur Epithelial Cells   FEW   (FEW)  /HPF


 


Urine Bacteria   RARE   (NEGATIVE)  /HPF


 


Urine Culture Reflexed   NO   (NO)  


 


Urine Glucose   NEGATIVE   (NEGATIVE)  mg/dL


 


Urine Opiates Level  NEG.    (NEGATIVE)  


 


Ur Methadone  NEG.    (NEGATIVE)  


 


Urine Barbiturates  NEG.    (NEGATIVE)  


 


Phenytoin     (10-20)  ug/ml


 


Ur Phencyclidine (PCP)  NEG.    (NEGATIVE)  


 


Urine Amphetamine  NEG.    (NEGATIVE)  


 


U Benzodiazepine Level  NEG.    (NEGATIVE)  


 


Urine Cocaine  NEG.    (NEGATIVE)  


 


Urine Marijuana (THC)  NEG.    (NEGATIVE)  


 


Ethyl Alcohol     (0.00-0.01)  %


 


Specimen Received   10/24/17 2000   














  10/24/17 10/24/17 10/24/17 Range/Units





  19:13 19:13 19:00 


 


WBC     (4.0-10.5)  K/mm3


 


RBC     (4.1-5.6)  M/mm3


 


Hgb     (12.5-18.0)  gm/dl


 


Hct     (42-50)  %


 


MCV     ()  fl


 


MCH     (26-32)  pg


 


MCHC     (32-36)  g/dl


 


RDW     (11.5-14.0)  %


 


Plt Count     (150-450)  K/mm3


 


MPV     (6-9.5)  fl


 


Gran %     (36.0-66.0)  %


 


Lymphocytes %     (24.0-44.0)  %


 


Monocytes %     (0.0-12.0)  %


 


Eosinophils %     (0.00-5.0)  %


 


Basophils %     (0.0-0.4)  %


 


Basophils #     (0-0.4)  


 


Smear Path Review     


 


VBG pH   7.38   (7.32-7.42)  


 


VBG pCO2 at Pat Temp   36 L   (42-55)  mm/Hg


 


VBG pO2 at Pat Temp   53 H   (25-40)  mm/Hg


 


VBG HCO3   21.3 L   (22-28)  meq/L


 


VBG O2 Sat (Irvin)   90.4 L   ()  


 


VBG Base Excess   -3.2 L   (-2.0-2.0)  


 


VBG Hemoglobin   15.7   


 


VBG Carboxyhemoglobin   6.1   (0.0-6.9)  % T HGB


 


POC Potassium   3.3 L   (3.5-5.1)  


 


Sodium     (136-145)  mEq/L


 


Potassium     (3.5-5.1)  mEq/L


 


Chloride     ()  mEq/L


 


Carbon Dioxide     (21-32)  mEq/L


 


Anion Gap     (5-15)  MEQ/L


 


BUN     (9-20)  mg/dL


 


Creatinine     (0.55-1.30)  mg/dl


 


Estimated GFR     ML/MIN


 


Glucose     ()  MG/DL


 


Lactic Acid  3.8 H    (0.4-2.0)  


 


Calcium     (8.5-10.1)  mg/dL


 


Magnesium    1.8  (1.8-2.4)  mg/dL


 


Total Bilirubin     (0.2-1.0)  mg/dL


 


AST     (15-37)  U/L


 


ALT     (12-78)  U/L


 


Alkaline Phosphatase     ()  U/L


 


Serum Total Protein     (6.4-8.2)  gm/dL


 


Albumin     (3.4-5.0)  g/dL


 


Ur Collection Type     


 


Urine Color     (YELLOW)  


 


Urine Appearance     (CLEAR)  


 


Urine pH     (5-6)  


 


Ur Specific Gravity     (1.005-1.025)  


 


Urine Protein     (Negative)  


 


Urine Ketones     (NEGATIVE)  


 


Urine Blood     (0-5)  Mike/ul


 


Urine Nitrite     (NEGATIVE)  


 


Urine Bilirubin     (NEGATIVE)  


 


Urine Urobilinogen     (0-1)  mg/dL


 


Ur Leukocyte Esterase     (NEGATIVE)  


 


Urine Microscopic RBC     (0-2)  /HPF


 


Urine Microscopic WBC     (0-5)  /HPF


 


Ur Epithelial Cells     (FEW)  /HPF


 


Urine Bacteria     (NEGATIVE)  /HPF


 


Urine Culture Reflexed     (NO)  


 


Urine Glucose     (NEGATIVE)  mg/dL


 


Urine Opiates Level     (NEGATIVE)  


 


Ur Methadone     (NEGATIVE)  


 


Urine Barbiturates     (NEGATIVE)  


 


Phenytoin     (10-20)  ug/ml


 


Ur Phencyclidine (PCP)     (NEGATIVE)  


 


Urine Amphetamine     (NEGATIVE)  


 


U Benzodiazepine Level     (NEGATIVE)  


 


Urine Cocaine     (NEGATIVE)  


 


Urine Marijuana (THC)     (NEGATIVE)  


 


Ethyl Alcohol    0.295 H*  (0.00-0.01)  %


 


Specimen Received     














  10/24/17 Range/Units





  19:00 


 


WBC   (4.0-10.5)  K/mm3


 


RBC   (4.1-5.6)  M/mm3


 


Hgb   (12.5-18.0)  gm/dl


 


Hct   (42-50)  %


 


MCV   ()  fl


 


MCH   (26-32)  pg


 


MCHC   (32-36)  g/dl


 


RDW   (11.5-14.0)  %


 


Plt Count   (150-450)  K/mm3


 


MPV   (6-9.5)  fl


 


Gran %   (36.0-66.0)  %


 


Lymphocytes %   (24.0-44.0)  %


 


Monocytes %   (0.0-12.0)  %


 


Eosinophils %   (0.00-5.0)  %


 


Basophils %   (0.0-0.4)  %


 


Basophils #   (0-0.4)  


 


Smear Path Review   


 


VBG pH   (7.32-7.42)  


 


VBG pCO2 at Pat Temp   (42-55)  mm/Hg


 


VBG pO2 at Pat Temp   (25-40)  mm/Hg


 


VBG HCO3   (22-28)  meq/L


 


VBG O2 Sat (Irvin)   ()  


 


VBG Base Excess   (-2.0-2.0)  


 


VBG Hemoglobin   


 


VBG Carboxyhemoglobin   (0.0-6.9)  % T HGB


 


POC Potassium   (3.5-5.1)  


 


Sodium  137  (136-145)  mEq/L


 


Potassium  3.1 L  (3.5-5.1)  mEq/L


 


Chloride  100  ()  mEq/L


 


Carbon Dioxide  21.8  (21-32)  mEq/L


 


Anion Gap  18.5 H  (5-15)  MEQ/L


 


BUN  5 L  (9-20)  mg/dL


 


Creatinine  0.58  (0.55-1.30)  mg/dl


 


Estimated GFR  > 60  ML/MIN


 


Glucose  109  ()  MG/DL


 


Lactic Acid   (0.4-2.0)  


 


Calcium  9.2  (8.5-10.1)  mg/dL


 


Magnesium   (1.8-2.4)  mg/dL


 


Total Bilirubin  1.20 H  (0.2-1.0)  mg/dL


 


AST  267 H  (15-37)  U/L


 


ALT  221 H  (12-78)  U/L


 


Alkaline Phosphatase  198 H  ()  U/L


 


Serum Total Protein  7.5  (6.4-8.2)  gm/dL


 


Albumin  3.8  (3.4-5.0)  g/dL


 


Ur Collection Type   


 


Urine Color   (YELLOW)  


 


Urine Appearance   (CLEAR)  


 


Urine pH   (5-6)  


 


Ur Specific Gravity   (1.005-1.025)  


 


Urine Protein   (Negative)  


 


Urine Ketones   (NEGATIVE)  


 


Urine Blood   (0-5)  Mike/ul


 


Urine Nitrite   (NEGATIVE)  


 


Urine Bilirubin   (NEGATIVE)  


 


Urine Urobilinogen   (0-1)  mg/dL


 


Ur Leukocyte Esterase   (NEGATIVE)  


 


Urine Microscopic RBC   (0-2)  /HPF


 


Urine Microscopic WBC   (0-5)  /HPF


 


Ur Epithelial Cells   (FEW)  /HPF


 


Urine Bacteria   (NEGATIVE)  /HPF


 


Urine Culture Reflexed   (NO)  


 


Urine Glucose   (NEGATIVE)  mg/dL


 


Urine Opiates Level   (NEGATIVE)  


 


Ur Methadone   (NEGATIVE)  


 


Urine Barbiturates   (NEGATIVE)  


 


Phenytoin  6.2 L  (10-20)  ug/ml


 


Ur Phencyclidine (PCP)   (NEGATIVE)  


 


Urine Amphetamine   (NEGATIVE)  


 


U Benzodiazepine Level   (NEGATIVE)  


 


Urine Cocaine   (NEGATIVE)  


 


Urine Marijuana (THC)   (NEGATIVE)  


 


Ethyl Alcohol   (0.00-0.01)  %


 


Specimen Received   














<TRACY VANESSA - Last Filed: 10/24/17 18:49>





- Progress


Progress: unchanged





<MADI TY - Last Filed: 10/24/17 21:14>





- Progress


Progress Note: 





10/24/17 19:00


Labs, thiamine, cxr, ct head, IVF ordered. Report to Dr Laura for further 

care and disposition.


 (TRACY VANESSA)








10/24/17 21:08


Alcohol level is 295. Pt has a dilantin level of 6.2. Pt has a K of 3.1. 

Platelet count is 17,000 which is the lowest it has been. Still has elevated 

LFTs. Pt also has a lactic acid of 3.8. I had a long conversation regarding his 

alcohol use and the effects of alcohol on his body, but patient is not willing 

to stop drinking and is more concerned with his back pain and receiving 

narcotics to continue working. I have offered the patient to be admitted but he 

does not want to stay. I have ordered KCL, NS fluids and dilantin 1000mg but 

the patient pulled out his line and left against medical advise. Prior to him 

leaving, I did go over the risks of leaving against medical advise, including 

worsening thrombocytopenia, possible bleeding issues, liver failure, seizure 

activity and possible death. The mother attempted to convince him to stay but 

he left against her wishes. (MADI TY)





<TRACY VANESSA - Last Filed: 10/24/17 18:49>





- Departure


Time of Disposition: 21:13


Departure Disposition: AMA


Critical Care Time: Yes


Critical Care Time(excluding separately billable procedures):  minutes





<MADI TY - Last Filed: 10/24/17 21:14>





- Departure


Clinical Impression: 


 Thrombocytopenia, Noncompliance with medication regimen, Seizure





Alcohol intoxication


Qualifiers:


 Complication of substance-induced condition: uncomplicated Qualified Code(s): 

F10.920 - Alcohol use, unspecified with intoxication, uncomplicated





Condition: Serious


Referrals: 


EDILBERTO HERNANDEZ [Primary Care Provider] -

## 2017-10-25 ENCOUNTER — HOSPITAL ENCOUNTER (OUTPATIENT)
Dept: HOSPITAL 33 - ED | Age: 47
Setting detail: OBSERVATION
LOS: 1 days | Discharge: HOME | End: 2017-10-26
Attending: FAMILY MEDICINE | Admitting: FAMILY MEDICINE
Payer: COMMERCIAL

## 2017-10-25 DIAGNOSIS — M19.90: ICD-10-CM

## 2017-10-25 DIAGNOSIS — I10: ICD-10-CM

## 2017-10-25 DIAGNOSIS — I25.2: ICD-10-CM

## 2017-10-25 DIAGNOSIS — G40.909: ICD-10-CM

## 2017-10-25 DIAGNOSIS — D69.6: ICD-10-CM

## 2017-10-25 DIAGNOSIS — K70.10: ICD-10-CM

## 2017-10-25 DIAGNOSIS — F10.239: Primary | ICD-10-CM

## 2017-10-25 LAB
ALBUMIN SERPL-MCNC: 3.6 G/DL (ref 3.4–5)
ALP SERPL-CCNC: 172 U/L (ref 46–116)
ALT SERPL-CCNC: 179 U/L (ref 12–78)
ANION GAP SERPL CALC-SCNC: 15.7 MEQ/L (ref 5–15)
APAP SPEC-MCNC: < 2 UG/ML (ref 10–30)
AST SERPL QL: 176 U/L (ref 15–37)
BACTERIA UR CULT: YES
BASOPHILS NFR BLD AUTO: 0.2 % (ref 0–0.4)
BILIRUB BLD-MCNC: 1.6 MG/DL (ref 0.2–1)
BUN SERPL-MCNC: 6 MG/DL (ref 9–20)
CHLORIDE SERPL-SCNC: 96 MEQ/L (ref 98–107)
CO2 SERPL-SCNC: 24.6 MEQ/L (ref 21–32)
COLLECTION TYPE: (no result)
COMPLETE URINE MICROSCOPIC?: YES
ETHANOL SERPL-MCNC: < 0.01 % (ref 0–0.01)
GLUCOSE SERPL-MCNC: 87 MG/DL (ref 70–110)
GLUCOSE UR-MCNC: NEGATIVE MG/DL
LIPASE SERPL-CCNC: 91 U/L (ref 73–393)
MCH RBC QN AUTO: 34.6 PG (ref 26–32)
NEUTROPHILS NFR BLD AUTO: 73.5 % (ref 36–66)
PLATELET # BLD AUTO: 22 K/MM3 (ref 150–450)
POTASSIUM SERPLBLD-SCNC: 4 MEQ/L (ref 3.5–5.1)
PROT SERPL-MCNC: 7.2 GM/DL (ref 6.4–8.2)
RBC # BLD AUTO: 3.93 M/MM3 (ref 4.1–5.6)
SODIUM SERPL-SCNC: 132 MEQ/L (ref 136–145)
WBC # BLD AUTO: 5.9 K/MM3 (ref 4–10.5)

## 2017-10-25 PROCEDURE — 83690 ASSAY OF LIPASE: CPT

## 2017-10-25 PROCEDURE — 87086 URINE CULTURE/COLONY COUNT: CPT

## 2017-10-25 PROCEDURE — 93041 RHYTHM ECG TRACING: CPT

## 2017-10-25 PROCEDURE — 81002 URINALYSIS NONAUTO W/O SCOPE: CPT

## 2017-10-25 PROCEDURE — 80053 COMPREHEN METABOLIC PANEL: CPT

## 2017-10-25 PROCEDURE — 82150 ASSAY OF AMYLASE: CPT

## 2017-10-25 PROCEDURE — 96376 TX/PRO/DX INJ SAME DRUG ADON: CPT

## 2017-10-25 PROCEDURE — 84484 ASSAY OF TROPONIN QUANT: CPT

## 2017-10-25 PROCEDURE — 80307 DRUG TEST PRSMV CHEM ANLYZR: CPT

## 2017-10-25 PROCEDURE — 96374 THER/PROPH/DIAG INJ IV PUSH: CPT

## 2017-10-25 PROCEDURE — 82962 GLUCOSE BLOOD TEST: CPT

## 2017-10-25 PROCEDURE — 85025 COMPLETE CBC W/AUTO DIFF WBC: CPT

## 2017-10-25 PROCEDURE — 99285 EMERGENCY DEPT VISIT HI MDM: CPT

## 2017-10-25 PROCEDURE — 96360 HYDRATION IV INFUSION INIT: CPT

## 2017-10-25 PROCEDURE — 36415 COLL VENOUS BLD VENIPUNCTURE: CPT

## 2017-10-25 PROCEDURE — 93005 ELECTROCARDIOGRAM TRACING: CPT

## 2017-10-25 RX ADMIN — LORAZEPAM PRN MG: 2 INJECTION, SOLUTION INTRAMUSCULAR; INTRAVENOUS at 19:17

## 2017-10-25 RX ADMIN — LORAZEPAM PRN MG: 2 INJECTION, SOLUTION INTRAMUSCULAR; INTRAVENOUS at 21:50

## 2017-10-25 NOTE — XRAY
Exam: Two-view chest from 10/24/2017.



Comparison: Two-view chest from 6/15/2017.



Indication: Right lower posterior rib pain/bruising.



Findings: Upright PA and lateral chest films are submitted for evaluation.



The lateral film is slightly rotated.  Also, the patient is rotated slightly

to the right on the frontal view.  The heart size and contour are normal.  The

harley and mediastinal structures appear unremarkable.



Inflation of the lungs is average.  No air space infiltrate, vascular

congestion, pneumothorax, or pleural effusion is seen.  There is slight

prominence at the anterior margin of the right seventh rib which could

represent minimal callus from a healed fracture at this site.  Correlate

clinically.  The remaining skeletal findings are remarkable only for minimal

lower thoracic anterior spurring.



Impression:



1.  No acute cardiopulmonary disease is seen.  No air space infiltrates or

pleural fluid is seen.  There is no pneumothorax.



2.  Questionable callus at anterior margin of right seventh rib which could be

due to an old healed fracture deformity.  Correlate clinically.
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
## 2017-10-26 VITALS — DIASTOLIC BLOOD PRESSURE: 67 MMHG | OXYGEN SATURATION: 96 % | SYSTOLIC BLOOD PRESSURE: 117 MMHG | HEART RATE: 89 BPM

## 2017-10-26 LAB
ALBUMIN SERPL-MCNC: 3.1 G/DL (ref 3.4–5)
ALP SERPL-CCNC: 158 U/L (ref 46–116)
ALT SERPL-CCNC: 135 U/L (ref 12–78)
ANION GAP SERPL CALC-SCNC: 13.3 MEQ/L (ref 5–15)
AST SERPL QL: 128 U/L (ref 15–37)
BASOPHILS NFR BLD AUTO: 0.3 % (ref 0–0.4)
BILIRUB BLD-MCNC: 1.2 MG/DL (ref 0.2–1)
BUN SERPL-MCNC: 5 MG/DL (ref 9–20)
CHLORIDE SERPL-SCNC: 100 MEQ/L (ref 98–107)
CO2 SERPL-SCNC: 25.6 MEQ/L (ref 21–32)
GLUCOSE SERPL-MCNC: 96 MG/DL (ref 70–110)
MCH RBC QN AUTO: 34.1 PG (ref 26–32)
NEUTROPHILS NFR BLD AUTO: 50.5 % (ref 36–66)
PLATELET # BLD AUTO: 22 K/MM3 (ref 150–450)
POTASSIUM SERPLBLD-SCNC: 3.6 MEQ/L (ref 3.5–5.1)
PROT SERPL-MCNC: 5.8 GM/DL (ref 6.4–8.2)
RBC # BLD AUTO: 3.57 M/MM3 (ref 4.1–5.6)
SODIUM SERPL-SCNC: 135 MEQ/L (ref 136–145)
WBC # BLD AUTO: 3.9 K/MM3 (ref 4–10.5)

## 2017-10-26 NOTE — PCM.SSS
History of Present Illness





- Chief Complaint


Chief Complaint: etoh withdrawl thombocytopenia


History of Present Illness: 


Mr.COWDEN is a 47 year old male with no local physician, he normally sees Dr Hernandez. He has a history of seizures, was in the ER for a seizure. He was 

acutely intoxicated 2 days ago, returned yesterday and alcohol level was 0 and 

he exhibited signs and symptoms of withdrawal. 








- Review of Systems


Constitutional: No Fever, No Chills


Respiratory: No Cough, No Short Of Breath


Cardiac: No Chest Pain, No Edema, No Syncope


Skin: No Rash


Neurological: Seizure


All Other Systems: Reviewed and Negative





Medications & Allergies


Home Medications: 


 Home Medication List





Phenytoin Sod Extended 100 mg* [Dilantin 100 MG***] 300 mg PO DAILY 10/24/17 [

History Confirmed 10/25/17]








Allergies/Adverse Reactions: 


 Allergies











Allergy/AdvReac Type Severity Reaction Status Date / Time


 


No Known Drug Allergies Allergy   Verified 10/24/17 18:39














- Past Medical History


Past Medical History: Yes


Neurological History: Seizures


ENT History: No Pertinent History


Cardiac History: Hypertension, Myocardial Infarction (MI)


Respiratory History: No Pertinent History


Endocrine Medical History: No Pertinent History


Musculoskelatal History: Arthritis


GI Medical History: No Pertinent History


 History: No Pertinent History


Pyscho-Social History: No Pertinent History


Male Reproductive Disorders: No Pertinent History


Comment: 2 LOWER DISCS ARE OUT,





- Past Surgical History


Past Surgical History: Yes


Neuro Surgical History: No Pertinent History


Cardiac History: Cardiac Catheterization


Respiratory Surgery: No Pertinent History


GI Surgical History: No Pertinent History


Genitourinary Surgical Hx: No Pertinent History


Musculskeletal Surgical Hx: No Pertinent History


Male Surgical History: No Pertinent History





- Social History


Smoking Status: Heavy tobacco smoker


How long have you smoked: 10years


Exposure to second hand smoke: No


Alcohol: Daily


Drug Use: none





- Physical Exam


Vital Signs: 


 Vital Signs - 24 hr











  Temp Pulse Resp BP Pulse Ox


 


 10/26/17 04:00   89  16  117/67  96


 


 10/26/17 00:00  97.9 F  102 H  20  156/91  97


 


 10/25/17 20:14  98.6 F  111 H  19  136/90  95


 


 10/25/17 20:00   102 H  25 H  


 


 10/25/17 18:32  99.2 F  120 H  16  143/116 


 


 10/25/17 17:03   120 H  16  143/116 


 


 10/25/17 16:12      98


 


 10/25/17 15:40   116 H  16  132/79 


 


 10/25/17 14:50   117 H  24  133/88  98


 


 10/25/17 14:36   112 H  20  150/75  98


 


 10/25/17 13:46  99.2 F  124 H  20  159/111  98











General Appearance: no apparent distress, alert


Neurologic Exam: alert, oriented x 3, cooperative, normal mood/affect, nml 

cerebellar function, nml station & gait, sensation nml, No motor deficits


Eye Exam: PERRL/EOMI, eyes nml inspection


Respiratory Exam: normal breath sounds, lungs clear, No respiratory distress


Cardiovascular Exam: regular rate/rhythm, normal heart sounds, normal 

peripheral pulses


Gastrointestinal/Abdomen Exam: soft, normal bowel sounds, No tenderness, No mass


Extremity Exam: normal inspection, normal range of motion, pelvis stable





Results





- Labs


Lab/Micro Results: 


 Lab Results-Last 24 Hours











  10/25/17 10/26/17 10/26/17 Range/Units





  20:45 03:22 03:22 


 


WBC    3.9 L  (4.0-10.5)  K/mm3


 


RBC    3.57 L  (4.1-5.6)  M/mm3


 


Hgb    12.2 L  (12.5-18.0)  gm/dl


 


Hct    36.2 L  (42-50)  %


 


MCV    101.4 H  ()  fl


 


MCH    34.1 H  (26-32)  pg


 


MCHC    33.7  (32-36)  g/dl


 


RDW    15.1 H  (11.5-14.0)  %


 


Plt Count    22 L*  (150-450)  K/mm3


 


MPV    11.6 H  (6-9.5)  fl


 


Gran %    50.5  (36.0-66.0)  %


 


Lymphocytes %    31.8  (24.0-44.0)  %


 


Monocytes %    15.9 H  (0.0-12.0)  %


 


Eosinophils %    1.5  (0.00-5.0)  %


 


Basophils %    0.3  (0.0-0.4)  %


 


Basophils #    0.01  (0-0.4)  


 


Sodium     (136-145)  mEq/L


 


Potassium     (3.5-5.1)  mEq/L


 


Chloride     ()  mEq/L


 


Carbon Dioxide     (21-32)  mEq/L


 


Anion Gap     (5-15)  MEQ/L


 


BUN     (9-20)  mg/dL


 


Creatinine     (0.55-1.30)  mg/dl


 


Estimated GFR     ML/MIN


 


Glucose     ()  MG/DL


 


Calcium     (8.5-10.1)  mg/dL


 


Total Bilirubin     (0.2-1.0)  mg/dL


 


AST     (15-37)  U/L


 


ALT     (12-78)  U/L


 


Alkaline Phosphatase     ()  U/L


 


Troponin I  < 0.017  < 0.017   (0.000-0.056)  ng/ml


 


Serum Total Protein     (6.4-8.2)  gm/dL


 


Albumin     (3.4-5.0)  g/dL














  10/26/17 Range/Units





  03:22 


 


WBC   (4.0-10.5)  K/mm3


 


RBC   (4.1-5.6)  M/mm3


 


Hgb   (12.5-18.0)  gm/dl


 


Hct   (42-50)  %


 


MCV   ()  fl


 


MCH   (26-32)  pg


 


MCHC   (32-36)  g/dl


 


RDW   (11.5-14.0)  %


 


Plt Count   (150-450)  K/mm3


 


MPV   (6-9.5)  fl


 


Gran %   (36.0-66.0)  %


 


Lymphocytes %   (24.0-44.0)  %


 


Monocytes %   (0.0-12.0)  %


 


Eosinophils %   (0.00-5.0)  %


 


Basophils %   (0.0-0.4)  %


 


Basophils #   (0-0.4)  


 


Sodium  135 L  (136-145)  mEq/L


 


Potassium  3.6  (3.5-5.1)  mEq/L


 


Chloride  100  ()  mEq/L


 


Carbon Dioxide  25.6  (21-32)  mEq/L


 


Anion Gap  13.3  (5-15)  MEQ/L


 


BUN  5 L  (9-20)  mg/dL


 


Creatinine  0.52 L  (0.55-1.30)  mg/dl


 


Estimated GFR  > 60  ML/MIN


 


Glucose  96  ()  MG/DL


 


Calcium  9.1  (8.5-10.1)  mg/dL


 


Total Bilirubin  1.20 H  (0.2-1.0)  mg/dL


 


AST  128 H  (15-37)  U/L


 


ALT  135 H  (12-78)  U/L


 


Alkaline Phosphatase  158 H  ()  U/L


 


Troponin I   (0.000-0.056)  ng/ml


 


Serum Total Protein  5.8 L  (6.4-8.2)  gm/dL


 


Albumin  3.1 L  (3.4-5.0)  g/dL














- Other Procedures and Tests


 Respiratory Therapy





10/25/17 20:00


Smoking Cessation Education ONCE 














Assessment/Plan


(1) Alcohol withdrawal


Current Visit: Yes   Status: Acute   


Qualifiers: 


   Complication of substance-induced condition: with unspecified complication   

Qualified Code(s): F10.239 - Alcohol dependence with withdrawal, unspecified   


Assessment & Plan: 


discussed help with alcohol detox and offered services, patient is not 

interested. insists he must go home today. he is alert, oriented and 

understands risks of withdrawal. I expect he will return home and start 

drinking again.


Code(s): F10.239 - ALCOHOL DEPENDENCE WITH WITHDRAWAL, UNSPECIFIED   





(2) Thrombocytopenia


Current Visit: Yes   Status: Acute   





(3) Alcoholic hepatitis


Current Visit: No   Status: Acute   


Qualifiers: 


   Ascites presence: without ascites   Qualified Code(s): K70.10 - Alcoholic 

hepatitis without ascites   


Code(s): K70.10 - ALCOHOLIC HEPATITIS WITHOUT ASCITES   





(4) Seizure


Current Visit: No   Status: Acute   


Assessment & Plan: 


continue dilantin, discussed adding keppra but patient has no insurance and 

states he can't afford any other medication. states he will f/u with Dr Hernandez and again insists he has to go home today.








Hospital Summary





- Vitals & Intake/Output


Vital Signs: 


 Vital Signs











Temperature  97.9 F   10/26/17 00:00


 


Pulse Rate  89   10/26/17 04:00


 


Respiratory Rate  16   10/26/17 04:00


 


Blood Pressure  117/67   10/26/17 04:00


 


O2 Sat by Pulse Oximetry  96   10/26/17 04:00











Intake & Output: 


 Intake & Output











 10/23/17 10/24/17 10/25/17 10/26/17





 11:59 11:59 11:59 11:59


 


Intake Total    1080


 


Output Total    550


 


Balance    530


 


Weight    75.523 kg














- Lab


Result Diagrams: 


 10/26/17 03:22





 10/26/17 03:22


Lab Results-Last 24 Hrs: 


 Lab Results-Last 24 Hours











  10/25/17 10/26/17 10/26/17 Range/Units





  20:45 03:22 03:22 


 


WBC    3.9 L  (4.0-10.5)  K/mm3


 


RBC    3.57 L  (4.1-5.6)  M/mm3


 


Hgb    12.2 L  (12.5-18.0)  gm/dl


 


Hct    36.2 L  (42-50)  %


 


MCV    101.4 H  ()  fl


 


MCH    34.1 H  (26-32)  pg


 


MCHC    33.7  (32-36)  g/dl


 


RDW    15.1 H  (11.5-14.0)  %


 


Plt Count    22 L*  (150-450)  K/mm3


 


MPV    11.6 H  (6-9.5)  fl


 


Gran %    50.5  (36.0-66.0)  %


 


Lymphocytes %    31.8  (24.0-44.0)  %


 


Monocytes %    15.9 H  (0.0-12.0)  %


 


Eosinophils %    1.5  (0.00-5.0)  %


 


Basophils %    0.3  (0.0-0.4)  %


 


Basophils #    0.01  (0-0.4)  


 


Sodium     (136-145)  mEq/L


 


Potassium     (3.5-5.1)  mEq/L


 


Chloride     ()  mEq/L


 


Carbon Dioxide     (21-32)  mEq/L


 


Anion Gap     (5-15)  MEQ/L


 


BUN     (9-20)  mg/dL


 


Creatinine     (0.55-1.30)  mg/dl


 


Estimated GFR     ML/MIN


 


Glucose     ()  MG/DL


 


Calcium     (8.5-10.1)  mg/dL


 


Total Bilirubin     (0.2-1.0)  mg/dL


 


AST     (15-37)  U/L


 


ALT     (12-78)  U/L


 


Alkaline Phosphatase     ()  U/L


 


Troponin I  < 0.017  < 0.017   (0.000-0.056)  ng/ml


 


Serum Total Protein     (6.4-8.2)  gm/dL


 


Albumin     (3.4-5.0)  g/dL














  10/26/17 Range/Units





  03:22 


 


WBC   (4.0-10.5)  K/mm3


 


RBC   (4.1-5.6)  M/mm3


 


Hgb   (12.5-18.0)  gm/dl


 


Hct   (42-50)  %


 


MCV   ()  fl


 


MCH   (26-32)  pg


 


MCHC   (32-36)  g/dl


 


RDW   (11.5-14.0)  %


 


Plt Count   (150-450)  K/mm3


 


MPV   (6-9.5)  fl


 


Gran %   (36.0-66.0)  %


 


Lymphocytes %   (24.0-44.0)  %


 


Monocytes %   (0.0-12.0)  %


 


Eosinophils %   (0.00-5.0)  %


 


Basophils %   (0.0-0.4)  %


 


Basophils #   (0-0.4)  


 


Sodium  135 L  (136-145)  mEq/L


 


Potassium  3.6  (3.5-5.1)  mEq/L


 


Chloride  100  ()  mEq/L


 


Carbon Dioxide  25.6  (21-32)  mEq/L


 


Anion Gap  13.3  (5-15)  MEQ/L


 


BUN  5 L  (9-20)  mg/dL


 


Creatinine  0.52 L  (0.55-1.30)  mg/dl


 


Estimated GFR  > 60  ML/MIN


 


Glucose  96  ()  MG/DL


 


Calcium  9.1  (8.5-10.1)  mg/dL


 


Total Bilirubin  1.20 H  (0.2-1.0)  mg/dL


 


AST  128 H  (15-37)  U/L


 


ALT  135 H  (12-78)  U/L


 


Alkaline Phosphatase  158 H  ()  U/L


 


Troponin I   (0.000-0.056)  ng/ml


 


Serum Total Protein  5.8 L  (6.4-8.2)  gm/dL


 


Albumin  3.1 L  (3.4-5.0)  g/dL














- Procedures and Test


Procedures and Tests throughout Hospitalization: 


 Therapy Orders & Screens





10/25/17 20:00


Smoking Cessation Education ONCE 


   Comment: 


   Diagnosis: etoh withdrawl thombocytopenia


   Smoking Status: Heavy tobacco smoker


   How long have you smoked: 10years


   Have you smoked in the past 12 months: Yes


   Approximately how many cigarettes per day: pack


   Do you dip or chew tobacco: No














- Discharge


Disposition: Home, Self-Care


Condition: Stable


Prescriptions: 


Continue


   Phenytoin Sod Extended 100 mg* [Dilantin 100 MG***] 300 mg PO DAILY


Follow up with: 


EDILBERTO HERNANDEZ [Primary Care Provider] -

## 2018-03-18 ENCOUNTER — HOSPITAL ENCOUNTER (EMERGENCY)
Dept: HOSPITAL 33 - ED | Age: 48
Discharge: LEFT BEFORE DISCHARGE | End: 2018-03-18
Payer: COMMERCIAL

## 2018-03-18 VITALS — DIASTOLIC BLOOD PRESSURE: 101 MMHG | HEART RATE: 101 BPM | SYSTOLIC BLOOD PRESSURE: 158 MMHG | OXYGEN SATURATION: 97 %

## 2018-03-18 DIAGNOSIS — R68.84: ICD-10-CM

## 2018-03-18 DIAGNOSIS — S09.90XA: Primary | ICD-10-CM

## 2018-03-18 DIAGNOSIS — R51: ICD-10-CM

## 2018-03-18 DIAGNOSIS — M54.2: ICD-10-CM

## 2018-03-18 LAB
ALBUMIN SERPL-MCNC: 4.6 G/DL (ref 3.5–5)
ALP SERPL-CCNC: 119 U/L (ref 38–126)
ALT SERPL-CCNC: 33 U/L (ref 0–50)
AMPHETAMINES UR QL: NEGATIVE
ANION GAP SERPL CALC-SCNC: 20.6 MEQ/L (ref 5–15)
AST SERPL QL: 59 U/L (ref 17–59)
BARBITURATES UR QL: NEGATIVE
BASOPHILS # BLD AUTO: 0.02 10*3/UL (ref 0–0.4)
BASOPHILS NFR BLD AUTO: 0.3 % (ref 0–0.4)
BENZODIAZ UR QL SCN: NEGATIVE
BILIRUB BLD-MCNC: 0.2 MG/DL (ref 0.2–1.3)
BUN SERPL-MCNC: 10 MG/DL (ref 9–20)
CALCIUM SPEC-MCNC: 9.8 MG/DL (ref 8.4–10.2)
CHLORIDE SERPL-SCNC: 105 MMOL/L (ref 98–107)
CO2 SERPL-SCNC: 26 MMOL/L (ref 22–30)
COCAINE UR QL SCN: NEGATIVE
CREAT SERPL-MCNC: 0.52 MG/DL (ref 0.66–1.25)
EOSINOPHIL # BLD AUTO: 0.1 10*3/UL (ref 0–0.5)
ETHANOL SERPL-MCNC: 477 MG/DL (ref 0–9)
GLUCOSE SERPL-MCNC: 106 MG/DL (ref 74–106)
GLUCOSE UR-MCNC: NEGATIVE MG/DL
GRANULOCYTES # BLD AUTO: 4.02 10*3/UL (ref 1.4–6.9)
HCT VFR BLD AUTO: 44 % (ref 42–50)
HGB BLD-MCNC: 15.2 GM/DL (ref 12.5–18)
INR PPP: 1.02 (ref 0.8–3)
LYMPHOCYTES # SPEC AUTO: 2.31 10*3/UL (ref 1–4.6)
MCH RBC QN AUTO: 33 PG (ref 26–32)
MCHC RBC AUTO-ENTMCNC: 34.5 G/DL (ref 32–36)
METHADONE UR QL: NEGATIVE
MONOCYTES # BLD AUTO: 0.56 10*3/UL (ref 0–1.3)
NEUTROPHILS NFR BLD AUTO: 57.3 % (ref 36–66)
OPIATES UR QL: NEGATIVE
PCP UR QL CFM>20 NG/ML: NEGATIVE
PLATELET # BLD AUTO: 78 K/MM3 (ref 150–450)
POTASSIUM SERPLBLD-SCNC: 4.1 MMOL/L (ref 3.5–5.1)
PROT SERPL-MCNC: 7.9 G/DL (ref 6.3–8.2)
PROT UR STRIP-MCNC: NEGATIVE MG/DL
RBC # BLD AUTO: 4.6 M/MM3 (ref 4.1–5.6)
SODIUM SERPL-SCNC: 147 MMOL/L (ref 137–145)
THC UR QL SCN: NEGATIVE
WBC # BLD AUTO: 7 K/MM3 (ref 4–10.5)

## 2018-03-18 PROCEDURE — 36415 COLL VENOUS BLD VENIPUNCTURE: CPT

## 2018-03-18 PROCEDURE — 85025 COMPLETE CBC W/AUTO DIFF WBC: CPT

## 2018-03-18 PROCEDURE — 83735 ASSAY OF MAGNESIUM: CPT

## 2018-03-18 PROCEDURE — 93041 RHYTHM ECG TRACING: CPT

## 2018-03-18 PROCEDURE — 70486 CT MAXILLOFACIAL W/O DYE: CPT

## 2018-03-18 PROCEDURE — 70450 CT HEAD/BRAIN W/O DYE: CPT

## 2018-03-18 PROCEDURE — 80307 DRUG TEST PRSMV CHEM ANLYZR: CPT

## 2018-03-18 PROCEDURE — 99282 EMERGENCY DEPT VISIT SF MDM: CPT

## 2018-03-18 PROCEDURE — 85610 PROTHROMBIN TIME: CPT

## 2018-03-18 PROCEDURE — 99284 EMERGENCY DEPT VISIT MOD MDM: CPT

## 2018-03-18 PROCEDURE — 72125 CT NECK SPINE W/O DYE: CPT

## 2018-03-18 PROCEDURE — 80053 COMPREHEN METABOLIC PANEL: CPT

## 2018-03-18 PROCEDURE — 81002 URINALYSIS NONAUTO W/O SCOPE: CPT
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
## 2018-03-19 LAB — BLD SMEAR INTERP: YES

## 2018-03-19 NOTE — XRAY
Indication: Left-sided injury following fall.



Multiple contiguous axial images obtained through the facial bones.  Sagittal

and coronal reformatted images obtained.



Comparison: April 22, 2015.



Images through the mandible slightly degraded by motion artifact and beam

artifact from dental amalgams.  Minimal left supraorbital soft tissue

swelling.  No acute fracture, suspicious bony lesions, or radiopaque foreign

body.  Orbits including roof, walls, and floors intact.  Paranasal sinuses are

clear.  Mild nasal septal deviation to the right.  Remaining visualized

noncontrasted soft tissues unremarkable.



CT cervical spine and head reported separately.



Impression: Motion and beam artifact.  Left superolateral soft tissue

swelling.  Negative acute fracture.



Comment: Preliminary interpretation was made by [facility].  No discrepancy.



CT DI 59.47

## 2018-03-19 NOTE — XRAY
Indication: Left-sided injury following fall.



Multiple contiguous axial images obtained through the cervical spine.

Sagittal and coronal reformatted images obtained.



Comparison: April 22, 2015.



Patient is mildly rotated and side bent to the right.  Several images slightly

degraded by motion artifact.  No acute fracture, suspicious bony lesions, or

spinal canal stenosis.  Minimal multilevel bilateral degenerative facet

arthropathy.  Sagittal and coronal reformatted images demonstrates vertebral

body heights and disc spaces maintained.  No acute compression fracture,

subluxation, or jumped facet.  Normal-appearing craniocervical junction.



Visualized noncontrasted soft tissues including lung apices unremarkable.



CT head and facial bones reported separately.



Impression: Motion artifact.  Minimal multilevel degenerative facet

arthropathy.  Negative for acute fracture/subluxation.



Comment: Preliminary interpretation was made by [facility].  No discrepancy.



CT .80

## 2019-01-09 ENCOUNTER — HOSPITAL ENCOUNTER (EMERGENCY)
Dept: HOSPITAL 33 - ED | Age: 49
Discharge: HOME | End: 2019-01-09
Payer: COMMERCIAL

## 2019-01-09 VITALS — OXYGEN SATURATION: 98 %

## 2019-01-09 VITALS — DIASTOLIC BLOOD PRESSURE: 83 MMHG | HEART RATE: 100 BPM | SYSTOLIC BLOOD PRESSURE: 141 MMHG

## 2019-01-09 DIAGNOSIS — N39.0: ICD-10-CM

## 2019-01-09 DIAGNOSIS — F10.951: Primary | ICD-10-CM

## 2019-01-09 DIAGNOSIS — D69.6: ICD-10-CM

## 2019-01-09 DIAGNOSIS — E87.6: ICD-10-CM

## 2019-01-09 DIAGNOSIS — R74.8: ICD-10-CM

## 2019-01-09 DIAGNOSIS — Z79.899: ICD-10-CM

## 2019-01-09 LAB
ALBUMIN SERPL-MCNC: 4.4 G/DL (ref 3.5–5)
ALP SERPL-CCNC: 227 U/L (ref 38–126)
ALT SERPL-CCNC: 84 U/L (ref 0–50)
AMPHETAMINES UR QL: NEGATIVE
ANION GAP SERPL CALC-SCNC: 17.6 MEQ/L (ref 5–15)
APAP SPEC-MCNC: < 10 UG/ML (ref 10–30)
AST SERPL QL: 221 U/L (ref 17–59)
BARBITURATES UR QL: NEGATIVE
BASOPHILS # BLD AUTO: 0.01 10*3/UL (ref 0–0.4)
BASOPHILS NFR BLD AUTO: 0.1 % (ref 0–0.4)
BENZODIAZ UR QL SCN: NEGATIVE
BILIRUB BLD-MCNC: 2.4 MG/DL (ref 0.2–1.3)
BLD SMEAR INTERP: YES
BUN SERPL-MCNC: 10 MG/DL (ref 9–20)
CALCIUM SPEC-MCNC: 10.9 MG/DL (ref 8.4–10.2)
CHLORIDE SERPL-SCNC: 88 MMOL/L (ref 98–107)
CO2 SERPL-SCNC: 31 MMOL/L (ref 22–30)
COCAINE UR QL SCN: NEGATIVE
CREAT SERPL-MCNC: 1.77 MG/DL (ref 0.66–1.25)
EOSINOPHIL # BLD AUTO: 0 10*3/UL (ref 0–0.5)
ETHANOL SERPL-MCNC: < 10 MG/DL (ref 0–10)
GLUCOSE SERPL-MCNC: 104 MG/DL (ref 74–106)
GLUCOSE UR-MCNC: NEGATIVE MG/DL
HCT VFR BLD AUTO: 35.4 % (ref 42–50)
HGB BLD-MCNC: 12.7 GM/DL (ref 12.5–18)
LYMPHOCYTES # SPEC AUTO: 0.73 10*3/UL (ref 1–4.6)
MCH RBC QN AUTO: 36.3 PG (ref 26–32)
MCHC RBC AUTO-ENTMCNC: 35.9 G/DL (ref 32–36)
METHADONE UR QL: NEGATIVE
MONOCYTES # BLD AUTO: 0.73 10*3/UL (ref 0–1.3)
NEUTROPHILS NFR BLD AUTO: 86.5 % (ref 36–66)
OPIATES UR QL: NEGATIVE
PCP UR QL CFM>20 NG/ML: NEGATIVE
PLATELET # BLD AUTO: 47 K/MM3 (ref 150–450)
POTASSIUM SERPLBLD-SCNC: 3.3 MMOL/L (ref 3.5–5.1)
PROT SERPL-MCNC: 7.4 G/DL (ref 6.3–8.2)
PROT UR STRIP-MCNC: NEGATIVE MG/DL
RBC # BLD AUTO: 3.49 M/MM3 (ref 4.1–5.6)
RBC #/AREA URNS HPF: (no result) /HPF (ref 0–2)
SALICYLATES SERPL-MCNC: < 1 MG/DL (ref 2–20)
SODIUM SERPL-SCNC: 134 MMOL/L (ref 137–145)
THC UR QL SCN: NEGATIVE
WBC # BLD AUTO: 10.9 K/MM3 (ref 4–10.5)
WBC #/AREA URNS HPF: (no result) /HPF (ref 0–5)

## 2019-01-09 PROCEDURE — 83690 ASSAY OF LIPASE: CPT

## 2019-01-09 PROCEDURE — 80053 COMPREHEN METABOLIC PANEL: CPT

## 2019-01-09 PROCEDURE — 36000 PLACE NEEDLE IN VEIN: CPT

## 2019-01-09 PROCEDURE — 85025 COMPLETE CBC W/AUTO DIFF WBC: CPT

## 2019-01-09 PROCEDURE — 36415 COLL VENOUS BLD VENIPUNCTURE: CPT

## 2019-01-09 PROCEDURE — 93041 RHYTHM ECG TRACING: CPT

## 2019-01-09 PROCEDURE — 74176 CT ABD & PELVIS W/O CONTRAST: CPT

## 2019-01-09 PROCEDURE — 80185 ASSAY OF PHENYTOIN TOTAL: CPT

## 2019-01-09 PROCEDURE — G0480 DRUG TEST DEF 1-7 CLASSES: HCPCS

## 2019-01-09 PROCEDURE — 80307 DRUG TEST PRSMV CHEM ANLYZR: CPT

## 2019-01-09 PROCEDURE — 96360 HYDRATION IV INFUSION INIT: CPT

## 2019-01-09 PROCEDURE — 93005 ELECTROCARDIOGRAM TRACING: CPT

## 2019-01-09 PROCEDURE — 82140 ASSAY OF AMMONIA: CPT

## 2019-01-09 PROCEDURE — 81001 URINALYSIS AUTO W/SCOPE: CPT

## 2019-01-09 PROCEDURE — 83605 ASSAY OF LACTIC ACID: CPT

## 2019-01-09 PROCEDURE — 99284 EMERGENCY DEPT VISIT MOD MDM: CPT
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
## 2019-01-09 NOTE — XRAY
Indication: Elevated liver function testing, bilirubin, and WBC.



Multiple contiguous axial images obtained through the abdomen and pelvis

without contrast as ordered.



Comparison: June 4, 2009.



Lung bases essentially clear.  Heart is not enlarged.  Distal esophagus now

demonstrates circumferential wall thickening, possible esophagitis.



Stomach is distended with food/fluid.  Noncontrasted stomach and bowel loops

appear nonobstructed.  Normal appendix.  No free fluid/air.  Mild diffuse

scattered colonic fecal debris throughout.  Minimal descending and proximal

sigmoid colon diverticulosis without diverticulitis.



Liver now demonstrates diffuse fatty attenuation.  New 8-9 mm round left mid

renal cortical hyperdensity.  Remaining liver, gallbladder, pancreas, spleen,

adrenal glands, kidneys, ureters, bladder, and aorta appear unremarkable for

noncontrast exam.



Osseous structures intact with mild degenerative changes throughout the spine.

 No ventral or inguinal hernias.



Impression:

1.  Mild fecal stasis without obstruction.  Minimal colonic diverticulosis

without diverticulitis.

2.  New indeterminate 8-9 mm round left mid renal cortical hyperdensity.

Ultrasound may yield further information and differentiate solid versus cystic

mass.

3.  New fatty liver.

4.  New distal esophageal wall thickening.  Rule out esophagitis.



CT DI 16.36

## 2019-01-10 ENCOUNTER — HOSPITAL ENCOUNTER (INPATIENT)
Dept: HOSPITAL 33 - ED | Age: 49
LOS: 1 days | Discharge: NURSING, REHAB OR LONG-TERM CARE | DRG: 897 | End: 2019-01-11
Attending: FAMILY MEDICINE | Admitting: FAMILY MEDICINE
Payer: COMMERCIAL

## 2019-01-10 DIAGNOSIS — F10.251: Primary | ICD-10-CM

## 2019-01-10 DIAGNOSIS — G40.909: ICD-10-CM

## 2019-01-10 DIAGNOSIS — I10: ICD-10-CM

## 2019-01-10 LAB
ALBUMIN SERPL-MCNC: 3.5 G/DL (ref 3.5–5)
ALBUMIN SERPL-MCNC: 4.1 G/DL (ref 3.5–5)
ALP SERPL-CCNC: 194 U/L (ref 38–126)
ALP SERPL-CCNC: 210 U/L (ref 38–126)
ALT SERPL-CCNC: 80 U/L (ref 0–50)
ALT SERPL-CCNC: 88 U/L (ref 0–50)
AMPHETAMINES UR QL: NEGATIVE
ANION GAP SERPL CALC-SCNC: 14.4 MEQ/L (ref 5–15)
AST SERPL QL: 239 U/L (ref 17–59)
AST SERPL QL: 333 U/L (ref 17–59)
BARBITURATES UR QL: NEGATIVE
BASOPHILS # BLD AUTO: 0.01 10*3/UL (ref 0–0.4)
BASOPHILS NFR BLD AUTO: 0.2 % (ref 0–0.4)
BENZODIAZ UR QL SCN: NEGATIVE
BILIRUB BLD-MCNC: 1.9 MG/DL (ref 0.2–1.3)
BILIRUB BLD-MCNC: 2.1 MG/DL (ref 0.2–1.3)
BLD SMEAR INTERP: YES
BUN SERPL-MCNC: 8 MG/DL (ref 9–20)
CALCIUM SPEC-MCNC: 10.1 MG/DL (ref 8.4–10.2)
CHLORIDE SERPL-SCNC: 95 MMOL/L (ref 98–107)
CO2 SERPL-SCNC: 27 MMOL/L (ref 22–30)
COCAINE UR QL SCN: NEGATIVE
CREAT SERPL-MCNC: 0.7 MG/DL (ref 0.66–1.25)
EOSINOPHIL # BLD AUTO: 0.02 10*3/UL (ref 0–0.5)
ETHANOL SERPL-MCNC: < 10 MG/DL (ref 0–10)
GLUCOSE SERPL-MCNC: 158 MG/DL (ref 74–106)
GLUCOSE UR-MCNC: NEGATIVE MG/DL
HCT VFR BLD AUTO: 34.6 % (ref 42–50)
HGB BLD-MCNC: 11.7 GM/DL (ref 12.5–18)
INR PPP: 1.21 (ref 0.8–3)
LIPASE SERPL-CCNC: 78 U/L (ref 23–300)
LYMPHOCYTES # SPEC AUTO: 0.43 10*3/UL (ref 1–4.6)
MCH RBC QN AUTO: 35.4 PG (ref 26–32)
MCHC RBC AUTO-ENTMCNC: 33.8 G/DL (ref 32–36)
METHADONE UR QL: NEGATIVE
MONOCYTES # BLD AUTO: 0.6 10*3/UL (ref 0–1.3)
NEUTROPHILS NFR BLD AUTO: 81.8 % (ref 36–66)
OPIATES UR QL: NEGATIVE
PCP UR QL CFM>20 NG/ML: NEGATIVE
PLATELET # BLD AUTO: 46 K/MM3 (ref 150–450)
POTASSIUM SERPLBLD-SCNC: 3.4 MMOL/L (ref 3.5–5.1)
PROT SERPL-MCNC: 6.1 G/DL (ref 6.3–8.2)
PROT SERPL-MCNC: 7.1 G/DL (ref 6.3–8.2)
PROT UR STRIP-MCNC: NEGATIVE MG/DL
PROTHROMBIN TIME: 14.1 SECONDS (ref 8.83–12.87)
RBC # BLD AUTO: 3.3 M/MM3 (ref 4.1–5.6)
RBC #/AREA URNS HPF: (no result) /HPF (ref 0–2)
SODIUM SERPL-SCNC: 133 MMOL/L (ref 137–145)
THC UR QL SCN: NEGATIVE
TROPONIN T SERPL HS-MCNC: < 0.012 NG/ML (ref 0–0.03)
VIT B12 SERPL-MCNC: 515 PG/ML (ref 239–931)
WBC # BLD AUTO: 5.8 K/MM3 (ref 4–10.5)
WBC #/AREA URNS HPF: (no result) /HPF (ref 0–5)

## 2019-01-10 PROCEDURE — 84425 ASSAY OF VITAMIN B-1: CPT

## 2019-01-10 PROCEDURE — 80048 BASIC METABOLIC PNL TOTAL CA: CPT

## 2019-01-10 PROCEDURE — 96360 HYDRATION IV INFUSION INIT: CPT

## 2019-01-10 PROCEDURE — G0480 DRUG TEST DEF 1-7 CLASSES: HCPCS

## 2019-01-10 PROCEDURE — 85025 COMPLETE CBC W/AUTO DIFF WBC: CPT

## 2019-01-10 PROCEDURE — 96374 THER/PROPH/DIAG INJ IV PUSH: CPT

## 2019-01-10 PROCEDURE — 84134 ASSAY OF PREALBUMIN: CPT

## 2019-01-10 PROCEDURE — 85610 PROTHROMBIN TIME: CPT

## 2019-01-10 PROCEDURE — 36000 PLACE NEEDLE IN VEIN: CPT

## 2019-01-10 PROCEDURE — 36415 COLL VENOUS BLD VENIPUNCTURE: CPT

## 2019-01-10 PROCEDURE — 81001 URINALYSIS AUTO W/SCOPE: CPT

## 2019-01-10 PROCEDURE — 83690 ASSAY OF LIPASE: CPT

## 2019-01-10 PROCEDURE — 70450 CT HEAD/BRAIN W/O DYE: CPT

## 2019-01-10 PROCEDURE — 80076 HEPATIC FUNCTION PANEL: CPT

## 2019-01-10 PROCEDURE — 83605 ASSAY OF LACTIC ACID: CPT

## 2019-01-10 PROCEDURE — 96376 TX/PRO/DX INJ SAME DRUG ADON: CPT

## 2019-01-10 PROCEDURE — 80053 COMPREHEN METABOLIC PANEL: CPT

## 2019-01-10 PROCEDURE — 82607 VITAMIN B-12: CPT

## 2019-01-10 PROCEDURE — 99285 EMERGENCY DEPT VISIT HI MDM: CPT

## 2019-01-10 PROCEDURE — 93005 ELECTROCARDIOGRAM TRACING: CPT

## 2019-01-10 PROCEDURE — 84484 ASSAY OF TROPONIN QUANT: CPT

## 2019-01-10 PROCEDURE — 80307 DRUG TEST PRSMV CHEM ANLYZR: CPT

## 2019-01-10 PROCEDURE — 96361 HYDRATE IV INFUSION ADD-ON: CPT

## 2019-01-10 PROCEDURE — 96375 TX/PRO/DX INJ NEW DRUG ADDON: CPT

## 2019-01-10 RX ADMIN — HYDROCHLOROTHIAZIDE SCH MG: 25 TABLET ORAL at 14:51

## 2019-01-10 RX ADMIN — PROMETHAZINE HYDROCHLORIDE PRN MG: 25 INJECTION INTRAMUSCULAR; INTRAVENOUS at 21:10

## 2019-01-10 RX ADMIN — PROMETHAZINE HYDROCHLORIDE PRN MG: 25 INJECTION INTRAMUSCULAR; INTRAVENOUS at 21:31

## 2019-01-10 RX ADMIN — THIAMINE HYDROCHLORIDE SCH MG: 100 INJECTION, SOLUTION INTRAMUSCULAR; INTRAVENOUS at 17:00

## 2019-01-10 RX ADMIN — THERA TABS SCH TAB: TAB at 17:00

## 2019-01-10 RX ADMIN — LORAZEPAM PRN MG: 2 INJECTION, SOLUTION INTRAMUSCULAR; INTRAVENOUS at 18:33

## 2019-01-10 RX ADMIN — POTASSIUM CHLORIDE AND SODIUM CHLORIDE SCH: 900; 150 INJECTION, SOLUTION INTRAVENOUS at 12:17

## 2019-01-10 RX ADMIN — LORAZEPAM PRN MG: 2 INJECTION, SOLUTION INTRAMUSCULAR; INTRAVENOUS at 20:14

## 2019-01-10 RX ADMIN — LORAZEPAM PRN MG: 2 INJECTION, SOLUTION INTRAMUSCULAR; INTRAVENOUS at 12:13

## 2019-01-10 RX ADMIN — FAMOTIDINE SCH MG: 10 INJECTION INTRAVENOUS at 21:35

## 2019-01-10 RX ADMIN — LORAZEPAM PRN MG: 2 INJECTION, SOLUTION INTRAMUSCULAR; INTRAVENOUS at 22:17

## 2019-01-10 RX ADMIN — LORAZEPAM PRN MG: 2 INJECTION, SOLUTION INTRAMUSCULAR; INTRAVENOUS at 14:21

## 2019-01-10 RX ADMIN — LORAZEPAM PRN MG: 2 INJECTION, SOLUTION INTRAMUSCULAR; INTRAVENOUS at 16:26

## 2019-01-10 RX ADMIN — PHENYTOIN SODIUM SCH MG: 100 CAPSULE, EXTENDED RELEASE ORAL at 14:51

## 2019-01-10 RX ADMIN — POTASSIUM CHLORIDE AND SODIUM CHLORIDE SCH MLS/HR: 900; 150 INJECTION, SOLUTION INTRAVENOUS at 14:53

## 2019-01-10 RX ADMIN — FOLIC ACID SCH MG: 1 TABLET ORAL at 11:51

## 2019-01-10 NOTE — XRAY
Indication: Status post fall.  Hallucinations.



Multiple contiguous axial images obtained through the head without contrast.



Comparison: March 18, 2018.



Again right parietal scalp metallic shrapnel produces extreme beam artifact

limiting these levels.  No acute intracranial hemorrhage, abnormal extra-axial

fluid collection, or mass effect.  Fourth ventricle is midline without

hydrocephalus.  Bony calvarium intact.  Mild mucosal thickening of the

visualized right maxillary sinus.  Remaining visualized paranasal sinuses and

mastoid air cells are clear.



Impression: Again right scalp metallic shrapnel artifact.  No new or acute

intracranial abnormalities.  Incidental right maxillary sinus disease.



CT DI 51.37
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
## 2019-01-11 VITALS — SYSTOLIC BLOOD PRESSURE: 115 MMHG | HEART RATE: 94 BPM | DIASTOLIC BLOOD PRESSURE: 78 MMHG

## 2019-01-11 LAB
ALBUMIN SERPL-MCNC: 3.6 G/DL (ref 3.5–5)
ALP SERPL-CCNC: 172 U/L (ref 38–126)
ALT SERPL-CCNC: 82 U/L (ref 0–50)
ANION GAP SERPL CALC-SCNC: 12.1 MEQ/L (ref 5–15)
AST SERPL QL: 179 U/L (ref 17–59)
BASOPHILS # BLD AUTO: 0.03 10*3/UL (ref 0–0.4)
BASOPHILS NFR BLD AUTO: 0.6 % (ref 0–0.4)
BILIRUB BLD-MCNC: 1.6 MG/DL (ref 0.2–1.3)
BUN SERPL-MCNC: 6 MG/DL (ref 9–20)
CALCIUM SPEC-MCNC: 9.5 MG/DL (ref 8.4–10.2)
CHLORIDE SERPL-SCNC: 104 MMOL/L (ref 98–107)
CO2 SERPL-SCNC: 27 MMOL/L (ref 22–30)
CREAT SERPL-MCNC: 0.57 MG/DL (ref 0.66–1.25)
EOSINOPHIL # BLD AUTO: 0.05 10*3/UL (ref 0–0.5)
GLUCOSE SERPL-MCNC: 84 MG/DL (ref 74–106)
HCT VFR BLD AUTO: 32.3 % (ref 42–50)
HGB BLD-MCNC: 10.7 GM/DL (ref 12.5–18)
LYMPHOCYTES # SPEC AUTO: 0.91 10*3/UL (ref 1–4.6)
MCH RBC QN AUTO: 35.4 PG (ref 26–32)
MCHC RBC AUTO-ENTMCNC: 33.1 G/DL (ref 32–36)
MONOCYTES # BLD AUTO: 0.56 10*3/UL (ref 0–1.3)
NEUTROPHILS NFR BLD AUTO: 68.5 % (ref 36–66)
PLATELET # BLD AUTO: 59 K/MM3 (ref 150–450)
POTASSIUM SERPLBLD-SCNC: 3 MMOL/L (ref 3.5–5.1)
PROT SERPL-MCNC: 6.2 G/DL (ref 6.3–8.2)
RBC # BLD AUTO: 3.02 M/MM3 (ref 4.1–5.6)
SODIUM SERPL-SCNC: 140 MMOL/L (ref 137–145)
WBC # BLD AUTO: 4.9 K/MM3 (ref 4–10.5)

## 2019-01-11 RX ADMIN — POTASSIUM CHLORIDE AND SODIUM CHLORIDE SCH MLS/HR: 900; 150 INJECTION, SOLUTION INTRAVENOUS at 00:56

## 2019-01-11 RX ADMIN — POTASSIUM CHLORIDE AND SODIUM CHLORIDE SCH MLS/HR: 900; 150 INJECTION, SOLUTION INTRAVENOUS at 08:58

## 2019-01-11 RX ADMIN — POTASSIUM CHLORIDE SCH MEQ: 10 TABLET, EXTENDED RELEASE ORAL at 08:58

## 2019-01-11 RX ADMIN — FAMOTIDINE SCH MG: 10 INJECTION INTRAVENOUS at 08:58

## 2019-01-11 RX ADMIN — THERA TABS SCH TAB: TAB at 08:58

## 2019-01-11 RX ADMIN — HYDROCHLOROTHIAZIDE SCH MG: 25 TABLET ORAL at 08:58

## 2019-01-11 RX ADMIN — FOLIC ACID SCH MG: 1 TABLET ORAL at 08:58

## 2019-01-11 RX ADMIN — LORAZEPAM PRN MG: 2 INJECTION, SOLUTION INTRAMUSCULAR; INTRAVENOUS at 00:56

## 2019-01-11 RX ADMIN — THIAMINE HYDROCHLORIDE SCH MG: 100 INJECTION, SOLUTION INTRAMUSCULAR; INTRAVENOUS at 08:57

## 2019-01-11 RX ADMIN — POTASSIUM CHLORIDE SCH MEQ: 10 TABLET, EXTENDED RELEASE ORAL at 14:59

## 2019-01-11 RX ADMIN — PHENYTOIN SODIUM SCH MG: 100 CAPSULE, EXTENDED RELEASE ORAL at 08:58

## 2019-01-11 NOTE — HP
CHIEF COMPLAINT:  Alcohol withdrawal syndrome, hallucinations. 



HISTORY OF PRESENT ILLNESS:  The patient is a 48 year-old white male patient who was 
brought into the emergency room after knocking on peoples doors in [place] telling them 
that the [employer] was after him.  The patient apparently had been seen 
in the emergency room the previous day with similar problems but apparently his mother was 
going to take him home and they sent him home under her care but the patient returned with 
similar problems and was felt the need to be admitted to the hospital. 



PAST MEDICAL HISTORY: Significant for alcoholism. He apparently has a seizure disorder and 
hypertension for which he takes phenytoin and lisinopril which he apparently has not taken 
in the last several days. 



ALLERGIES: NKDA.



PHYSICAL EXAMINATION:  The patient's O2 saturation is 97% on room air on admission. His 
vital signs were temperature 98.2F, pulse 119, respiratory rate 16, blood pressure 
174/125. 

HEENT:  Normocephalic, atraumatic. Pupils equal round reactive to light. Oropharynx is 
dry. 

NECK:  Supple without lymphadenopathy, thyromegaly or JVD. 

CHEST:  Clear to auscultation. 

HEART: Regular rate and rhythm without murmurs, rubs or gallops.

ABDOMEN:  Soft. No palpable masses. 

EXTREMITIES:  Without cyanosis, clubbing or edema. 

NEUROLOGIC:  He is mumbling and disoriented although he is attempting to answer questions. 




LAB DATA AND TESTS:  Nonfasting sugar at 158. BUN 8, creatinine 0.7, sodium slightly low 
at 133, potassium 3.4, total bilirubin 2.10, , ALT 88. ETOH less than 10. Lactic 
acid 5.1.  International normalized ratio 1.21. UA was normal with specific gravity 1.002. 
Troponin less than 0.012. Urine drug screen was negative. White blood cell count 5,800, 
hemoglobin 11.7, PLT count low at 46,000 but apparently through evaluation of his chart 
previous this appears to be a chronic condition. 



ASSESSMENT: The patient has been brought into the hospital for close monitoring in the 
ICU, placed on DT protocol with IV Ativan to control his disorientation and agitation. We 
will obtain a [facility] consult when he is of more clear of mind. He has received 
multivitamins and thiamine IV as well as IV fluid rehydration. We will start him back on 
his home medications as stated above.

## 2019-01-12 VITALS — OXYGEN SATURATION: 97 %

## 2019-08-08 ENCOUNTER — HOSPITAL ENCOUNTER (OUTPATIENT)
Dept: HOSPITAL 33 - ED | Age: 49
Setting detail: OBSERVATION
LOS: 1 days | Discharge: HOME | End: 2019-08-09
Attending: FAMILY MEDICINE | Admitting: FAMILY MEDICINE
Payer: COMMERCIAL

## 2019-08-08 DIAGNOSIS — R56.9: ICD-10-CM
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
## 2019-08-08 DIAGNOSIS — R07.9: Primary | ICD-10-CM

## 2019-08-08 DIAGNOSIS — E87.6: ICD-10-CM

## 2019-08-08 DIAGNOSIS — R79.89: ICD-10-CM

## 2019-08-08 DIAGNOSIS — D69.6: ICD-10-CM

## 2019-08-08 DIAGNOSIS — F10.20: ICD-10-CM

## 2019-08-08 DIAGNOSIS — R79.1: ICD-10-CM

## 2019-08-08 DIAGNOSIS — R16.0: ICD-10-CM

## 2019-08-08 LAB
ALBUMIN SERPL-MCNC: 3.7 G/DL (ref 3.5–5)
ALP SERPL-CCNC: 287 U/L (ref 38–126)
ALT SERPL-CCNC: 71 U/L (ref 0–50)
ANION GAP SERPL CALC-SCNC: 22.6 MEQ/L (ref 5–15)
AST SERPL QL: 298 U/L (ref 17–59)
BASOPHILS # BLD AUTO: 0.01 10*3/UL (ref 0–0.4)
BASOPHILS NFR BLD AUTO: 0.1 % (ref 0–0.4)
BILIRUB BLD-MCNC: 7.7 MG/DL (ref 0.2–1.3)
BNP SERPL-MCNC: 183 PG/ML (ref 0–450)
BUN SERPL-MCNC: 11 MG/DL (ref 9–20)
CALCIUM SPEC-MCNC: 8.6 MG/DL (ref 8.4–10.2)
CHLORIDE SERPL-SCNC: 88 MMOL/L (ref 98–107)
CO2 SERPL-SCNC: 25 MMOL/L (ref 22–30)
CREAT SERPL-MCNC: 0.62 MG/DL (ref 0.66–1.25)
EOSINOPHIL # BLD AUTO: 0.01 10*3/UL (ref 0–0.5)
ETHANOL SERPL-MCNC: 290 MG/DL (ref 0–10)
GLUCOSE SERPL-MCNC: 189 MG/DL (ref 74–106)
GRANULOCYTES # BLD AUTO: 9.94 10*3/UL (ref 1.4–6.9)
HCT VFR BLD AUTO: 36.6 % (ref 42–50)
HGB BLD-MCNC: 13.1 GM/DL (ref 12.5–18)
LYMPHOCYTES # SPEC AUTO: 1.16 10*3/UL (ref 1–4.6)
MAGNESIUM SERPL-MCNC: 1.3 MG/DL (ref 1.6–2.3)
MCH RBC QN AUTO: 36.5 PG (ref 26–32)
MCHC RBC AUTO-ENTMCNC: 35.8 G/DL (ref 32–36)
MONOCYTES # BLD AUTO: 0.96 10*3/UL (ref 0–1.3)
NEUTROPHILS NFR BLD AUTO: 82.3 % (ref 36–66)
PLATELET # BLD AUTO: 48 K/MM3 (ref 150–450)
POTASSIUM SERPLBLD-SCNC: 2.5 MMOL/L (ref 3.5–5.1)
PROT SERPL-MCNC: 7.2 G/DL (ref 6.3–8.2)
RBC # BLD AUTO: 3.58 M/MM3 (ref 4.1–5.6)
SODIUM SERPL-SCNC: 133 MMOL/L (ref 137–145)
WBC # BLD AUTO: 12.1 K/MM3 (ref 4–10.5)

## 2019-08-08 PROCEDURE — 83880 ASSAY OF NATRIURETIC PEPTIDE: CPT

## 2019-08-08 PROCEDURE — G0378 HOSPITAL OBSERVATION PER HR: HCPCS

## 2019-08-08 PROCEDURE — 80074 ACUTE HEPATITIS PANEL: CPT

## 2019-08-08 PROCEDURE — 36415 COLL VENOUS BLD VENIPUNCTURE: CPT

## 2019-08-08 PROCEDURE — 96375 TX/PRO/DX INJ NEW DRUG ADDON: CPT

## 2019-08-08 PROCEDURE — 71260 CT THORAX DX C+: CPT

## 2019-08-08 PROCEDURE — 93268 ECG RECORD/REVIEW: CPT

## 2019-08-08 PROCEDURE — 99285 EMERGENCY DEPT VISIT HI MDM: CPT

## 2019-08-08 PROCEDURE — 85379 FIBRIN DEGRADATION QUANT: CPT

## 2019-08-08 PROCEDURE — 94640 AIRWAY INHALATION TREATMENT: CPT

## 2019-08-08 PROCEDURE — 96367 TX/PROPH/DG ADDL SEQ IV INF: CPT

## 2019-08-08 PROCEDURE — 84132 ASSAY OF SERUM POTASSIUM: CPT

## 2019-08-08 PROCEDURE — 96361 HYDRATE IV INFUSION ADD-ON: CPT

## 2019-08-08 PROCEDURE — G0480 DRUG TEST DEF 1-7 CLASSES: HCPCS

## 2019-08-08 PROCEDURE — 96365 THER/PROPH/DIAG IV INF INIT: CPT

## 2019-08-08 PROCEDURE — 80053 COMPREHEN METABOLIC PANEL: CPT

## 2019-08-08 PROCEDURE — 84484 ASSAY OF TROPONIN QUANT: CPT

## 2019-08-08 PROCEDURE — 80307 DRUG TEST PRSMV CHEM ANLYZR: CPT

## 2019-08-08 PROCEDURE — 96374 THER/PROPH/DIAG INJ IV PUSH: CPT

## 2019-08-08 PROCEDURE — 83735 ASSAY OF MAGNESIUM: CPT

## 2019-08-08 PROCEDURE — 99291 CRITICAL CARE FIRST HOUR: CPT

## 2019-08-08 PROCEDURE — 80185 ASSAY OF PHENYTOIN TOTAL: CPT

## 2019-08-08 PROCEDURE — 76705 ECHO EXAM OF ABDOMEN: CPT

## 2019-08-08 PROCEDURE — 85610 PROTHROMBIN TIME: CPT

## 2019-08-08 PROCEDURE — 96360 HYDRATION IV INFUSION INIT: CPT

## 2019-08-08 PROCEDURE — 85025 COMPLETE CBC W/AUTO DIFF WBC: CPT

## 2019-08-08 PROCEDURE — 71045 X-RAY EXAM CHEST 1 VIEW: CPT

## 2019-08-08 NOTE — ERPHSYRPT
- History of Present Illness


Time Seen by Provider: 08/08/19 23:00


Source: patient, family (Spouse)


Exam Limitations: no limitations


Physician History: 





Short of air tonight and chest pain - about one hour ago


Allergies/Adverse Reactions: 








No Known Drug Allergies Allergy (Verified 01/10/19 08:06)


 





Home Medications: 








Phenytoin Sod Extended 100 mg* [Dilantin 100 MG***] 200 mg PO BID 10/24/17 [

History]


Gabapentin 200 mg PO BID 08/08/19 [History]


Ibuprofen 200 mg*** [Motrin 200 mg***] 200 mg PO QID PRN PRN 08/09/19 [History]





Hx Tetanus, Diphtheria Vaccination/Date Given: No


Hx Influenza Vaccination/Date Given: No


Hx Pneumococcal Vaccination/Date Given: No





- Review of Systems


Constitutional: Malaise


Eyes: Eye Redness (Right eye)


Respiratory: Cough, Dyspnea


Cardiac: Chest Pain (heavines with breathing efforts)


Abdominal/Gastrointestinal: No Symptoms





- Past Medical History


Pertinent Past Medical History: Yes


Neurological History: Seizures


ENT History: No Pertinent History


Cardiac History: Hypertension, Myocardial Infarction (MI)


Respiratory History: No Pertinent History


Endocrine Medical History: No Pertinent History


Musculoskeletal History: Arthritis


GI Medical History: No Pertinent History


 History: No Pertinent History


Psycho-Social History: No Pertinent History


Male Reproductive Disorders: No Pertinent History


Other Medical History: 2 LOWER DISCS ARE OUT,





- Past Surgical History


Past Surgical History: Yes


Neuro Surgical History: No Pertinent History


Cardiac: Cardiac Catheterization


Respiratory: No Pertinent History


Gastrointestinal: No Pertinent History


Genitourinary: No Pertinent History


Musculoskeletal: No Pertinent History


Male Surgical History: No Pertinent History





- Social History


Smoking Status: Current every day smoker


How long have you smoked: 10years


Exposure to second hand smoke: Yes


Drug Use: none


Patient Lives Alone: No





- Nursing Vital Signs


Nursing Vital Signs: 


 Initial Vital Signs











Temperature  98.9 F   08/08/19 22:48


 


Pulse Rate  111 H  08/08/19 22:48


 


Respiratory Rate  18   08/08/19 22:48


 


Blood Pressure  120/87   08/08/19 22:48


 


O2 Sat by Pulse Oximetry  95   08/08/19 22:48








 Pain Scale











Pain Intensity                 4

















- Physical Exam


General Appearance: moderate distress, alert, anxiety


Eye Exam: PERRL/EOMI, eyes nml inspection (Except R eye conjunctival erythema 

and injection)


Ears, Nose, Throat Exam: hearing grossly normal, normal ENT inspection, normal 

pharynx


Neck Exam: normal inspection, non-tender, supple, No carotid bruit


Respiratory Exam: wheezing


Cardiovascular/Chest Exam: normal heart sounds, regular rate/rhythm


Abdominal/Gastrointestinal Exam: soft, normal bowel sounds, No tenderness


Extremity Exam: non-tender, normal range of motion, no calf tenderness, no 

pedal edema


Neurologic Exam: alert, oriented x 3, cooperative


Skin Exam: normal color, warm, dry


**SpO2 Interpretation**: normal


O2 Delivery: Room Air





- Course


Nursing assessment & vital signs reviewed: Yes


EKG Interpreted by Me: RATE (118), Sinus Tach, ST Elev (slight V3 no other ), 

Other (QRS-T wave changes similar to 1/10/19)





- CT Exams


  ** Chest


CT Interpretation: Negative (Per rad report)


Ordered Tests: 


 Active Orders 24 hr











 Category Date Time Status


 


 Up With Assistance ROUTINE Activity  08/09/19 02:18 Active


 


 Code Status Order ROUTINE Care  08/09/19 02:18 Active


 


 IV Care Q6H Care  08/09/19 02:18 Active


 


 Place in Observation ROUTINE Care  08/09/19 02:18 Active


 


 Regular Diet Diet  08/09/19 Breakfast Active


 


 CHEST 1 VIEW (PORTABLE) Stat Exams  08/08/19 23:01 Taken


 


 CHEST WITH CONTRAST [CT] Stat Exams  08/08/19 23:39 Taken


 


 CBC W DIFF AM.LAB Lab  08/09/19 05:07 Completed


 


 CBC W DIFF Stat Lab  08/08/19 23:25 Completed


 


 CMP AM.LAB Lab  08/09/19 05:07 Completed


 


 CMP Stat Lab  08/08/19 23:25 Completed


 


 D-DIMER QUANTITATION Stat Lab  08/08/19 23:25 Completed


 


 ETHYL ALCOHOL Stat Lab  08/08/19 23:25 Completed


 


 MAGNESIUM Routine Lab  08/09/19 05:07 Completed


 


 MAGNESIUM Stat Lab  08/08/19 23:25 Completed


 


 NT PRO BNP Stat Lab  08/08/19 23:25 Completed


 


 TROPONIN Q3H Lab  08/08/19 23:25 Completed


 


 Oxygen Nasal Cannula 2 lpm RT  08/09/19 02:18 Active


 


 Respiratory Therapy Assessment DAILY RT  08/08/19 23:17 Completed








Medication Summary











Generic Name Dose Route Start Last Admin





  Trade Name Freq  PRN Reason Stop Dose Admin


 


Sodium Chloride  1,000 mls @ 100 mls/hr  08/08/19 23:15  08/09/19 03:40





  Sodium Chloride 0.9% 1000 Ml  IV  09/07/19 23:14  100 mls/hr





  .Q10H LALI   Administration





     





     





     





     


 


Potassium Chloride  20 meq in 100 mls @ 50 mls/hr  08/08/19 23:45  08/09/19 03:

40





  Potassium Chloride 20 Meq In Water 100ml  IV  08/09/19 03:44  50 mls/hr





  Q2H LALI   Administration





     





     





     





     














Discontinued Medications














Generic Name Dose Route Start Last Admin





  Trade Name Edy  PRN Reason Stop Dose Admin


 


Albuterol/Ipratropium  3 ml  08/08/19 23:02  08/08/19 23:14





  Duoneb 0.5-3 Mg/3 Ml Neb**  IH  08/08/19 23:03  3 ml





  STAT ONE   Administration





     





     





     





     


 


Albuterol/Ipratropium  Confirm  08/08/19 23:11  





  Duoneb 0.5-3 Mg/3 Ml Neb**  Administered  08/08/19 23:12  





  Dose   





  3 ml   





  IH   





  .STK-MED ONE   





     





     





     





     


 


Dexamethasone Sodium Phosphate  8 mg  08/08/19 23:05  08/08/19 23:14





  Decadron 10mg Inj.  IV  08/08/19 23:06  8 mg





  STAT ONE   Administration





     





     





     





     


 


Dexamethasone Sodium Phosphate  Confirm  08/08/19 23:13  





  Decadron 10mg Inj.  Administered  08/08/19 23:14  





  Dose   





  10 mg   





  .ROUTE   





  .STK-MED ONE   





     





     





     





     


 


Magnesium Sulfate/Dextrose  100 mls @ 200 mls/hr  08/09/19 02:00  08/09/19 02:11





  Magnesium 1 Gm / 100 Ml D5w***  IV  08/09/19 02:29  200 mls/hr





  STAT ONE   Administration





     





     





     





     


 


Magnesium Sulfate/Dextrose  Confirm  08/09/19 02:07  





  Magnesium 1 Gm / 100 Ml D5w***  Administered  08/09/19 02:08  





  Dose   





  100 mls @ ud   





  IV   





  .STK-MED ONE   





     





     





     





     


 


Morphine Sulfate  1 mg  08/09/19 01:59  08/09/19 02:11





  Morphine Sulfate 2 Mg Inj***  IV  08/09/19 02:00  1 mg





  STAT ONE   Administration





     





     





     





     


 


Morphine Sulfate  Confirm  08/09/19 02:07  





  Morphine Sulfate 4 Mg Inj***  Administered  08/09/19 02:08  





  Dose   





  4 mg   





  .ROUTE   





  .STK-MED ONE   





     





     





     





     


 


Ondansetron HCl  4 mg  08/09/19 02:00  08/09/19 02:11





  Zofran 4 Mg/2 Ml Vial**  IV  08/09/19 02:01  4 mg





  STAT ONE   Administration





     





     





     





     


 


Ondansetron HCl  Confirm  08/09/19 02:07  





  Zofran 4 Mg/2 Ml Vial**  Administered  08/09/19 02:08  





  Dose   





  4 mg   





  .ROUTE   





  .STK-MED ONE   





     





     





     





     











Lab/Rad Data: 


 Laboratory Result Diagrams





 08/08/19 23:25 





 08/08/19 23:25 





 Laboratory Results











  08/08/19 08/08/19 08/08/19 Range/Units





  23:25 23:25 23:25 


 


WBC     (4.0-10.5)  K/mm3


 


RBC     (4.1-5.6)  M/mm3


 


Hgb     (12.5-18.0)  gm/dl


 


Hct     (42-50)  %


 


MCV     ()  fl


 


MCH     (26-32)  pg


 


MCHC     (32-36)  g/dl


 


RDW     (11.5-14.0)  %


 


Plt Count     (150-450)  K/mm3


 


MPV     (6-9.5)  fl


 


Gran %     (36.0-66.0)  %


 


Eos # (Auto)     (0-0.5)  


 


Absolute Lymphs (auto)     (1.0-4.6)  


 


Absolute Monos (auto)     (0.0-1.3)  


 


Lymphocytes %     (24.0-44.0)  %


 


Monocytes %     (0.0-12.0)  %


 


Eosinophils %     (0.00-5.0)  %


 


Basophils %     (0.0-0.4)  %


 


Absolute Granulocytes     (1.4-6.9)  


 


Basophils #     (0-0.4)  


 


D-Dimer   1904 H*   (215-500)  ng/mL


 


Sodium    133 L  (137-145)  mmol/L


 


Potassium    2.5 L*  (3.5-5.1)  mmol/L


 


Chloride    88 L  ()  mmol/L


 


Carbon Dioxide    25  (22-30)  mmol/L


 


Anion Gap    22.6 H  (5-15)  MEQ/L


 


BUN    11  (9-20)  mg/dL


 


Creatinine    0.62 L  (0.66-1.25)  mg/dL


 


Estimated GFR    > 60.0  ML/MIN


 


Glucose    189 H  ()  mg/dL


 


Calcium    8.6  (8.4-10.2)  mg/dL


 


Magnesium    1.3 L  (1.6-2.3)  mg/dL


 


Total Bilirubin    7.70 H  (0.2-1.3)  mg/dL


 


AST    298 H  (17-59)  U/L


 


ALT    71 H  (0-50)  U/L


 


Alkaline Phosphatase    287 H  ()  U/L


 


Troponin I  < 0.012    (0.000-0.034)  ng/mL


 


NT-Pro-B Natriuret Pep    183  (0-450)  pg/mL


 


Serum Total Protein    7.2  (6.3-8.2)  g/dL


 


Albumin    3.7  (3.5-5.0)  g/dL


 


Ethyl Alcohol    290 H  (0-10)  mg/dL














  08/08/19 Range/Units





  23:25 


 


WBC  12.1 H  (4.0-10.5)  K/mm3


 


RBC  3.58 L  (4.1-5.6)  M/mm3


 


Hgb  13.1  (12.5-18.0)  gm/dl


 


Hct  36.6 L  (42-50)  %


 


MCV  102.2 H  ()  fl


 


MCH  36.5 H  (26-32)  pg


 


MCHC  35.8  (32-36)  g/dl


 


RDW  13.8  (11.5-14.0)  %


 


Plt Count  48 L  (150-450)  K/mm3


 


MPV  11.5 H  (6-9.5)  fl


 


Gran %  82.3 H  (36.0-66.0)  %


 


Eos # (Auto)  0.01  (0-0.5)  


 


Absolute Lymphs (auto)  1.16  (1.0-4.6)  


 


Absolute Monos (auto)  0.96  (0.0-1.3)  


 


Lymphocytes %  9.6 L  (24.0-44.0)  %


 


Monocytes %  7.9  (0.0-12.0)  %


 


Eosinophils %  0.1  (0.00-5.0)  %


 


Basophils %  0.1  (0.0-0.4)  %


 


Absolute Granulocytes  9.94 H  (1.4-6.9)  


 


Basophils #  0.01  (0-0.4)  


 


D-Dimer   (215-500)  ng/mL


 


Sodium   (137-145)  mmol/L


 


Potassium   (3.5-5.1)  mmol/L


 


Chloride   ()  mmol/L


 


Carbon Dioxide   (22-30)  mmol/L


 


Anion Gap   (5-15)  MEQ/L


 


BUN   (9-20)  mg/dL


 


Creatinine   (0.66-1.25)  mg/dL


 


Estimated GFR   ML/MIN


 


Glucose   ()  mg/dL


 


Calcium   (8.4-10.2)  mg/dL


 


Magnesium   (1.6-2.3)  mg/dL


 


Total Bilirubin   (0.2-1.3)  mg/dL


 


AST   (17-59)  U/L


 


ALT   (0-50)  U/L


 


Alkaline Phosphatase   ()  U/L


 


Troponin I   (0.000-0.034)  ng/mL


 


NT-Pro-B Natriuret Pep   (0-450)  pg/mL


 


Serum Total Protein   (6.3-8.2)  g/dL


 


Albumin   (3.5-5.0)  g/dL


 


Ethyl Alcohol   (0-10)  mg/dL














- Progress


Progress: improved


Air Movement: good


Progress Note: 





08/09/19 02:12


IV NS with K and Mg add on hung; lungs much improved with one Duoneb.  

Discussed admission (OBS) with patient - willing to be admitted.  


Discussed with : Eitan


Will see patient in: hospital (observation)





- Departure


Departure Disposition: Observation


Clinical Impression: 


 Shortness of breath, Hypokalemia





Condition: Stable


Critical Care Time: Yes


Critical Care Time(excluding separately billable procedures): Critical 30-74 

mins

## 2019-08-09 VITALS — DIASTOLIC BLOOD PRESSURE: 80 MMHG | SYSTOLIC BLOOD PRESSURE: 136 MMHG | HEART RATE: 104 BPM | OXYGEN SATURATION: 99 %

## 2019-08-09 LAB
ALBUMIN SERPL-MCNC: 3.2 G/DL (ref 3.5–5)
ALP SERPL-CCNC: 250 U/L (ref 38–126)
ALT SERPL-CCNC: 61 U/L (ref 0–50)
ANION GAP SERPL CALC-SCNC: 16.4 MEQ/L (ref 5–15)
AST SERPL QL: 246 U/L (ref 17–59)
BASOPHILS # BLD AUTO: 0 10*3/UL (ref 0–0.4)
BASOPHILS NFR BLD AUTO: 0 % (ref 0–0.4)
BILIRUB BLD-MCNC: 7.8 MG/DL (ref 0.2–1.3)
BLD SMEAR INTERP: YES
BUN SERPL-MCNC: 7 MG/DL (ref 9–20)
CALCIUM SPEC-MCNC: 7.7 MG/DL (ref 8.4–10.2)
CHLORIDE SERPL-SCNC: 90 MMOL/L (ref 98–107)
CO2 SERPL-SCNC: 27 MMOL/L (ref 22–30)
CREAT SERPL-MCNC: 0.39 MG/DL (ref 0.66–1.25)
EOSINOPHIL # BLD AUTO: 0 10*3/UL (ref 0–0.5)
GLUCOSE SERPL-MCNC: 124 MG/DL (ref 74–106)
GRANULOCYTES # BLD AUTO: 6.94 10*3/UL (ref 1.4–6.9)
HCT VFR BLD AUTO: 30.5 % (ref 42–50)
HGB BLD-MCNC: 11.4 GM/DL (ref 12.5–18)
INR PPP: 1.46 (ref 0.8–3)
LYMPHOCYTES # SPEC AUTO: 0.32 10*3/UL (ref 1–4.6)
MAGNESIUM SERPL-MCNC: 1.3 MG/DL (ref 1.6–2.3)
MCH RBC QN AUTO: 38 PG (ref 26–32)
MCHC RBC AUTO-ENTMCNC: 37.4 G/DL (ref 32–36)
MONOCYTES # BLD AUTO: 0.42 10*3/UL (ref 0–1.3)
NEUTROPHILS NFR BLD AUTO: 90.3 % (ref 36–66)
PLATELET # BLD AUTO: 30 K/MM3 (ref 150–450)
POTASSIUM SERPLBLD-SCNC: 3.3 MMOL/L (ref 3.5–5.1)
PROT SERPL-MCNC: 6.3 G/DL (ref 6.3–8.2)
PROTHROMBIN TIME: 16.6 SECONDS (ref 8.83–12.87)
RBC # BLD AUTO: 3 M/MM3 (ref 4.1–5.6)
SODIUM SERPL-SCNC: 130 MMOL/L (ref 137–145)
WBC # BLD AUTO: 7.7 K/MM3 (ref 4–10.5)

## 2019-08-09 RX ADMIN — POTASSIUM CHLORIDE SCH MLS/HR: 14.9 INJECTION, SOLUTION INTRAVENOUS at 00:36

## 2019-08-09 RX ADMIN — POTASSIUM CHLORIDE SCH MLS/HR: 14.9 INJECTION, SOLUTION INTRAVENOUS at 03:40

## 2019-08-09 NOTE — XRAY
Indication: Short of breath.



Comparison: October 24, 2017.



Portable chest slightly underinflated and clear.  Heart and mediastinal

structures within normal limits.  Bony thorax intact.



Impression: Nonacute underinflated chest.

## 2019-08-09 NOTE — XRAY
Indication: Short of breath.  Elevated d-dimer.



Multiple contiguous axial images obtained through the chest using 80 cc Isovue

370 contrast and PE protocol.



Comparison: None.



There is satisfactory opacification of the pulmonary arteries to include the

lobar and segmental branches.  No filling defect or pulmonary embolus.  Heart

is not enlarged.  Aorta is normal in course and caliber.  No pathologic

mediastinal/hilar lymphadenopathy.  Lungs demonstrates mild bilateral

dependent atelectasis.  No suspicious pulmonary mass, infiltrate, or effusion.



Bony thorax intact with old sternum fracture and mild dextroscoliosis centered

at T7.



Limited upper abdomen demonstrates diffuse fatty liver.



Impression: Negative pulmonary embolus.  No acute cardiopulmonary

abnormalities.  Incidental chronic bony findings and fatty liver.



Comment: Preliminary interpretation was made by [facility] who does not report

incidental bony findings and fatty liver



CT DI 13.33

## 2019-08-09 NOTE — XRAY
Indication: Right upper quadrant pain.



Two-dimensional gallbladder sonogram performed.



Comparison: None



Gallbladder normally distended with minimal sludge in the dependent portion.

No gallstones.  Gallbladder wall is thickened measuring 3.3 mm but no

pericholecystic fluid.  Common bile duct measures 2.1 mm.  No intrahepatic

biliary distention.  Liver is enlarged measuring 21.2 cm in length and appears

fatty in echogenic liver.  No focal solid/cystic hepatic mass or ascites.

Remaining visualized pancreas and right kidney sonographically unremarkable.

Right kidney measures 11.1 cm in length.



Impression:

1.  Gallbladder sludge with gallbladder wall thickening.  Rule out chronic

cholecystitis.

2.  Fatty hepatomegaly.

## 2019-08-10 LAB — HBV SURFACE AB SER-ACNC: <3.5 MIU/ML (ref 0–8.49)

## 2021-09-03 NOTE — HP
CHIEF COMPLAINT:  Left-sided chest pain after fall from a seizure. 



HISTORY OF PRESENT ILLNESS:  The patient is a 49 year-old, severely alcoholic fellow who 
reports he has been having seizures over the past few weeks. He is on Dilantin for seizure 
disorder. The patient reports he sees Dr. Corby Hernandez but has not seen him over the 
last several months. The patient reports that he does drink alcohol and about a pint a 
day. The patient has identified with multiple abnormalities on his labs consistent with 
cirrhosis of the liver. The patient currently is awake, alert, oriented x3. 



HOME MEDICATIONS: Dilantin 300 mg a day, gabapentin 200 mg b.i.d.



ALLERGIES:  NKDA.  



PHYSICAL EXAMINATION:  Revealed a disheveled white male patient currently in no obvious 
distress.  His vital signs on admission show temperature to be 98.9F, pulse 111, 
respiratory rate 18, blood pressure 120/87.  O2 saturations 95%. 

HEENT:  No obvious trauma although he does have some scleral icterus to the left eye. The 
right eye has some blood in the sclera from what appears to be likely recent trauma from 
seizure activity most likely. Oropharynx is pink and moist.

NECK:  Supple. 

CHEST:  Clear to auscultation with good air movement bilaterally. 

HEART: Regular rate and rhythm. 

ABDOMEN: Soft with a mildly enlarged liver. There were no other palpable masses felt.  

EXTREMITIES: Reveal multiple bruises and abrasions. No cyanosis or clubbing is present.  

NEUROLOGIC: No focal deficits were noted on evaluation.



LAB DATA AND TESTS: Revealed an elevated D-dimer for which he received CT scan which did 
rule out pulmonary embolism. His D-dimer was 1,904. He had troponin less than 0.012. His 
white blood cell count was 12,100, hemoglobin 13.1, PLT count however is only 48,000. His 
glucose is 189, BUN 11, creatinine 0.62. Sodium slightly low at 133, potassium 2.5. 
Magnesium is also somewhat low at 1.3. His total bilirubin is 7.7.  , ALT 71, 
alkaline phosphatase 287. ETOH was 290. 

 

ASSESSMENT:  The patient was admitted to the hospital due to his chest pain. He had serial 
troponins drawn. He was also monitored for seizures which he has had none since his 
admission. We will draw a Dilantin level as one was not done in the emergency room 
initially and load him up to get him in the therapeutic range once we find out what his 
level is. We will run a gallbladder ultrasound to be sure he does not have any stones or 
dilated ducts due to his significant elevation in his bilirubin levels as well. If he is 
able to eat and ambulate on his own we will discharge him home with instructions to follow 
up with his regular doctor which is Dr. Hernandez as soon as he can and will also 
determine whether he has a neurologist involved in his care as well. He was admonished to 
stop drinking but has no interest in that presently. Labs within last 12 months Labs within last 12 months Labs within last 12 months Labs within last 12 months Labs within last 12 months Labs within last 12 months Labs within last 12 months Labs within last 12 months Labs within last 12 months Labs within last 12 months Labs within last 12 months Labs within last 12 months Labs within last 12 months Labs within last 12 months Labs within last 12 months Labs within last 12 months